# Patient Record
Sex: MALE | Race: WHITE | NOT HISPANIC OR LATINO | Employment: FULL TIME | URBAN - METROPOLITAN AREA
[De-identification: names, ages, dates, MRNs, and addresses within clinical notes are randomized per-mention and may not be internally consistent; named-entity substitution may affect disease eponyms.]

---

## 2018-06-21 ENCOUNTER — HISTORICAL (OUTPATIENT)
Dept: ADMINISTRATIVE | Facility: HOSPITAL | Age: 58
End: 2018-06-21

## 2018-06-21 ENCOUNTER — HISTORICAL (OUTPATIENT)
Dept: LAB | Facility: HOSPITAL | Age: 58
End: 2018-06-21

## 2018-06-21 LAB
ABS NEUT (OLG): 6.9
ALBUMIN SERPL-MCNC: 4.3 GM/DL (ref 3.4–5)
ALBUMIN/GLOB SERPL: 1.87 {RATIO} (ref 1.5–2.5)
ALP SERPL-CCNC: 70 UNIT/L (ref 38–126)
ALT SERPL-CCNC: 20 UNIT/L (ref 7–52)
APPEARANCE, UA: ABNORMAL
AST SERPL-CCNC: 18 UNIT/L (ref 15–37)
BACTERIA #/AREA URNS AUTO: ABNORMAL /HPF
BILIRUB SERPL-MCNC: 0.6 MG/DL (ref 0.2–1)
BILIRUB UR QL STRIP: NEGATIVE MG/DL
BILIRUBIN DIRECT+TOT PNL SERPL-MCNC: 0.1 MG/DL (ref 0–0.5)
BILIRUBIN DIRECT+TOT PNL SERPL-MCNC: 0.5 MG/DL
BUN SERPL-MCNC: 28 MG/DL (ref 7–18)
CALCIUM SERPL-MCNC: 9 MG/DL (ref 8.5–10)
CHLORIDE SERPL-SCNC: 104 MMOL/L (ref 98–107)
CHOLEST SERPL-MCNC: 178 MG/DL (ref 0–200)
CHOLEST/HDLC SERPL: 6.1 {RATIO}
CO2 SERPL-SCNC: 27 MMOL/L (ref 21–32)
COLOR UR: YELLOW
CREAT SERPL-MCNC: 1.07 MG/DL (ref 0.6–1.3)
DEPRECATED CALCIDIOL+CALCIFEROL SERPL-MC: 30.8 NG/ML (ref 30–80)
ERYTHROCYTE [DISTWIDTH] IN BLOOD BY AUTOMATED COUNT: 13 % (ref 11.5–17)
EST. AVERAGE GLUCOSE BLD GHB EST-MCNC: 123 MG/DL
GLOBULIN SER-MCNC: 2.3 GM/DL (ref 1.2–3)
GLUCOSE (UA): NEGATIVE MG/DL
GLUCOSE SERPL-MCNC: 121 MG/DL (ref 74–106)
HBA1C MFR BLD: 5.9 % (ref 4.4–6.4)
HCT VFR BLD AUTO: 47.9 % (ref 42–52)
HDLC SERPL-MCNC: 29 MG/DL (ref 35–60)
HGB BLD-MCNC: 16.1 GM/DL (ref 14–18)
HGB UR QL STRIP: ABNORMAL UNIT/L
KETONES UR QL STRIP: NEGATIVE MG/DL
LDLC SERPL CALC-MCNC: 89 MG/DL (ref 0–129)
LEUKOCYTE ESTERASE UR QL STRIP: ABNORMAL UNIT/L
LYMPHOCYTES # BLD AUTO: 1.7 X10(3)/MCL (ref 0.6–3.4)
LYMPHOCYTES NFR BLD AUTO: 17.4 % (ref 13–40)
MCH RBC QN AUTO: 29.9 PG (ref 27–31.2)
MCHC RBC AUTO-ENTMCNC: 34 GM/DL (ref 32–36)
MCV RBC AUTO: 89 FL (ref 80–94)
MONOCYTES # BLD AUTO: 1.2 X10(3)/MCL (ref 0–1.8)
MONOCYTES NFR BLD AUTO: 12.2 % (ref 0.1–24)
NEUTROPHILS NFR BLD AUTO: 70.4 % (ref 47–80)
NITRITE UR QL STRIP.AUTO: NEGATIVE
PH UR STRIP: 5.5 [PH]
PLATELET # BLD AUTO: 188 X10(3)/MCL (ref 130–400)
PMV BLD AUTO: 9 FL
POTASSIUM SERPL-SCNC: 5 MMOL/L (ref 3.5–5.1)
PROT SERPL-MCNC: 6.6 GM/DL (ref 6.4–8.2)
PROT UR QL STRIP: ABNORMAL MG/DL
PSA SERPL-MCNC: 4.48 NG/ML (ref 0–3.5)
RBC # BLD AUTO: 5.38 X10(6)/MCL (ref 4.7–6.1)
RBC #/AREA URNS HPF: ABNORMAL /HPF
SODIUM SERPL-SCNC: 138 MMOL/L (ref 136–145)
SP GR UR STRIP: >1.03
SQUAMOUS EPITHELIAL, UA: ABNORMAL /LPF
TRIGL SERPL-MCNC: 329 MG/DL (ref 30–150)
TSH SERPL-ACNC: 1.17 MIU/ML (ref 0.35–4.94)
UROBILINOGEN UR STRIP-ACNC: 0.2 MG/DL
VLDLC SERPL CALC-MCNC: 65.8 MG/DL
WBC # SPEC AUTO: 9.8 X10(3)/MCL (ref 4.5–11.5)
WBC #/AREA URNS AUTO: ABNORMAL /[HPF]

## 2018-06-23 LAB — FINAL CULTURE: NORMAL

## 2018-06-27 ENCOUNTER — HISTORICAL (OUTPATIENT)
Dept: ADMINISTRATIVE | Facility: HOSPITAL | Age: 58
End: 2018-06-27

## 2018-12-03 ENCOUNTER — HISTORICAL (OUTPATIENT)
Dept: ADMINISTRATIVE | Facility: HOSPITAL | Age: 58
End: 2018-12-03

## 2019-06-25 ENCOUNTER — HISTORICAL (OUTPATIENT)
Dept: LAB | Facility: HOSPITAL | Age: 59
End: 2019-06-25

## 2019-06-25 ENCOUNTER — HISTORICAL (OUTPATIENT)
Dept: ADMINISTRATIVE | Facility: HOSPITAL | Age: 59
End: 2019-06-25

## 2019-06-25 LAB
ABS NEUT (OLG): 6.8 X10(3)/MCL (ref 2.1–9.2)
APPEARANCE, UA: ABNORMAL
BACTERIA #/AREA URNS AUTO: ABNORMAL /HPF
BILIRUB UR QL STRIP: NEGATIVE MG/DL
CHOLEST SERPL-MCNC: 131 MG/DL (ref 0–200)
CHOLEST/HDLC SERPL: 4.9 {RATIO}
COLOR UR: YELLOW
DEPRECATED CALCIDIOL+CALCIFEROL SERPL-MC: 26.3 NG/ML (ref 30–80)
ERYTHROCYTE [DISTWIDTH] IN BLOOD BY AUTOMATED COUNT: 13.1 % (ref 11.5–17)
GLUCOSE (UA): NEGATIVE MG/DL
HCT VFR BLD AUTO: 47.3 % (ref 42–52)
HDLC SERPL-MCNC: 27 MG/DL (ref 35–60)
HGB BLD-MCNC: 16.2 GM/DL (ref 14–18)
HGB UR QL STRIP: ABNORMAL UNIT/L
KETONES UR QL STRIP: NEGATIVE MG/DL
LDLC SERPL CALC-MCNC: 56 MG/DL (ref 0–129)
LEUKOCYTE ESTERASE UR QL STRIP: ABNORMAL UNIT/L
LYMPHOCYTES # BLD AUTO: 1.1 X10(3)/MCL (ref 0.6–3.4)
LYMPHOCYTES NFR BLD AUTO: 12.6 % (ref 13–40)
MCH RBC QN AUTO: 29.8 PG (ref 27–31.2)
MCHC RBC AUTO-ENTMCNC: 34 GM/DL (ref 32–36)
MCV RBC AUTO: 87 FL (ref 80–94)
MONOCYTES # BLD AUTO: 0.7 X10(3)/MCL (ref 0.1–1.3)
MONOCYTES NFR BLD AUTO: 8.2 % (ref 0.1–24)
NEUTROPHILS NFR BLD AUTO: 79.2 % (ref 47–80)
NITRITE UR QL STRIP.AUTO: NEGATIVE
PH UR STRIP: 6 [PH]
PLATELET # BLD AUTO: 180 X10(3)/MCL (ref 130–400)
PMV BLD AUTO: 9.3 FL (ref 9.4–12.4)
PROT UR QL STRIP: NEGATIVE MG/DL
PSA SERPL-MCNC: 4.46 NG/ML (ref 0–3.5)
RBC # BLD AUTO: 5.43 X10(6)/MCL (ref 4.7–6.1)
RBC #/AREA URNS HPF: ABNORMAL /HPF
SP GR UR STRIP: 1.02
SQUAMOUS EPITHELIAL, UA: ABNORMAL /LPF
TRIGL SERPL-MCNC: 182 MG/DL (ref 30–150)
TSH SERPL-ACNC: 1.7 MIU/ML (ref 0.35–4.94)
UROBILINOGEN UR STRIP-ACNC: 0.2 MG/DL
VLDLC SERPL CALC-MCNC: 36.4 MG/DL
WBC # SPEC AUTO: 8.6 X10(3)/MCL (ref 4.5–11.5)
WBC #/AREA URNS AUTO: ABNORMAL /[HPF]

## 2019-06-27 LAB — FINAL CULTURE: NO GROWTH

## 2019-08-26 LAB — CRC RECOMMENDATION EXT: NORMAL

## 2019-12-20 ENCOUNTER — HISTORICAL (OUTPATIENT)
Dept: ADMINISTRATIVE | Facility: HOSPITAL | Age: 59
End: 2019-12-20

## 2019-12-20 LAB
CHOLEST SERPL-MCNC: 193 MG/DL (ref 0–200)
CHOLEST/HDLC SERPL: 5.5 {RATIO}
DEPRECATED CALCIDIOL+CALCIFEROL SERPL-MC: 39 NG/ML (ref 30–80)
HDLC SERPL-MCNC: 35 MG/DL (ref 35–60)
LDLC SERPL CALC-MCNC: 122 MG/DL (ref 0–129)
PSA SERPL-MCNC: 5.33 NG/ML (ref 0–3.5)
TRIGL SERPL-MCNC: 134 MG/DL (ref 30–150)
VLDLC SERPL CALC-MCNC: 26.8 MG/DL

## 2020-06-26 ENCOUNTER — HISTORICAL (OUTPATIENT)
Dept: ADMINISTRATIVE | Facility: HOSPITAL | Age: 60
End: 2020-06-26

## 2020-06-26 LAB
ABS NEUT (OLG): 7.3 X10(3)/MCL (ref 2.1–9.2)
ALBUMIN SERPL-MCNC: 4.2 GM/DL (ref 3.4–5)
ALBUMIN/GLOB SERPL: 2 {RATIO} (ref 1.5–2.5)
ALP SERPL-CCNC: 66 UNIT/L (ref 38–126)
ALT SERPL-CCNC: 23 UNIT/L (ref 7–52)
AST SERPL-CCNC: 21 UNIT/L (ref 15–37)
BILIRUB SERPL-MCNC: 0.4 MG/DL (ref 0.2–1)
BILIRUBIN DIRECT+TOT PNL SERPL-MCNC: 0.1 MG/DL (ref 0–0.5)
BILIRUBIN DIRECT+TOT PNL SERPL-MCNC: 0.3 MG/DL
BUN SERPL-MCNC: 22 MG/DL (ref 7–18)
CALCIUM SERPL-MCNC: 9.3 MG/DL (ref 8.5–10)
CHLORIDE SERPL-SCNC: 105 MMOL/L (ref 98–107)
CHOLEST SERPL-MCNC: 179 MG/DL (ref 0–200)
CHOLEST/HDLC SERPL: 5.6 {RATIO}
CO2 SERPL-SCNC: 26 MMOL/L (ref 21–32)
CREAT SERPL-MCNC: 0.95 MG/DL (ref 0.6–1.3)
DEPRECATED CALCIDIOL+CALCIFEROL SERPL-MC: 51.3 NG/ML (ref 30–80)
ERYTHROCYTE [DISTWIDTH] IN BLOOD BY AUTOMATED COUNT: 13.4 % (ref 11.5–17)
GLOBULIN SER-MCNC: 2.1 GM/DL (ref 1.2–3)
GLUCOSE SERPL-MCNC: 118 MG/DL (ref 74–106)
HCT VFR BLD AUTO: 48.3 % (ref 42–52)
HDLC SERPL-MCNC: 32 MG/DL (ref 35–60)
HGB BLD-MCNC: 16.2 GM/DL (ref 14–18)
LDLC SERPL CALC-MCNC: 98 MG/DL (ref 0–129)
LYMPHOCYTES # BLD AUTO: 1.1 X10(3)/MCL (ref 0.6–3.4)
LYMPHOCYTES NFR BLD AUTO: 12.3 % (ref 13–40)
MCH RBC QN AUTO: 29.6 PG (ref 27–31.2)
MCHC RBC AUTO-ENTMCNC: 34 GM/DL (ref 32–36)
MCV RBC AUTO: 88 FL (ref 80–94)
MONOCYTES # BLD AUTO: 0.8 X10(3)/MCL (ref 0.1–1.3)
MONOCYTES NFR BLD AUTO: 9.2 % (ref 0.1–24)
NEUTROPHILS NFR BLD AUTO: 78.5 % (ref 47–80)
PLATELET # BLD AUTO: 196 X10(3)/MCL (ref 130–400)
PMV BLD AUTO: 9.1 FL (ref 9.4–12.4)
POTASSIUM SERPL-SCNC: 5.2 MMOL/L (ref 3.5–5.1)
PROT SERPL-MCNC: 6.3 GM/DL (ref 6.4–8.2)
PSA SERPL-MCNC: 3.04 NG/ML (ref 0–3.5)
RBC # BLD AUTO: 5.47 X10(6)/MCL (ref 4.7–6.1)
SODIUM SERPL-SCNC: 140 MMOL/L (ref 136–145)
TRIGL SERPL-MCNC: 227 MG/DL (ref 30–150)
TSH SERPL-ACNC: 1.33 MIU/ML (ref 0.35–4.94)
VLDLC SERPL CALC-MCNC: 45.4 MG/DL
WBC # SPEC AUTO: 9.2 X10(3)/MCL (ref 4.5–11.5)

## 2020-06-29 LAB
EST. AVERAGE GLUCOSE BLD GHB EST-MCNC: 123 MG/DL
HBA1C MFR BLD: 5.9 % (ref 4.4–6.4)

## 2020-12-29 ENCOUNTER — HISTORICAL (OUTPATIENT)
Dept: ADMINISTRATIVE | Facility: HOSPITAL | Age: 60
End: 2020-12-29

## 2020-12-29 LAB
CHOLEST SERPL-MCNC: 162 MG/DL (ref 0–200)
CHOLEST/HDLC SERPL: 5.1 {RATIO}
EST. AVERAGE GLUCOSE BLD GHB EST-MCNC: 123 MG/DL
HBA1C MFR BLD: 5.9 % (ref 4.4–6.4)
HDLC SERPL-MCNC: 32 MG/DL (ref 35–60)
LDLC SERPL CALC-MCNC: 79 MG/DL (ref 0–129)
PSA SERPL-MCNC: 1.96 NG/ML (ref 0–3.5)
TRIGL SERPL-MCNC: 256 MG/DL (ref 30–150)
VLDLC SERPL CALC-MCNC: 51.2 MG/DL

## 2021-06-02 ENCOUNTER — HISTORICAL (OUTPATIENT)
Dept: ADMINISTRATIVE | Facility: HOSPITAL | Age: 61
End: 2021-06-02

## 2021-06-24 ENCOUNTER — HISTORICAL (OUTPATIENT)
Dept: ADMINISTRATIVE | Facility: HOSPITAL | Age: 61
End: 2021-06-24

## 2021-06-24 LAB
ABS NEUT (OLG): 7.9 X10(3)/MCL (ref 2.1–9.2)
ALBUMIN SERPL-MCNC: 4.4 GM/DL (ref 3.4–5)
ALBUMIN/GLOB SERPL: 1.91 {RATIO} (ref 1.5–2.5)
ALP SERPL-CCNC: 68 UNIT/L (ref 38–126)
ALT SERPL-CCNC: 25 UNIT/L (ref 7–52)
APPEARANCE, UA: ABNORMAL
AST SERPL-CCNC: 21 UNIT/L (ref 15–37)
BACTERIA #/AREA URNS AUTO: ABNORMAL /HPF
BILIRUB SERPL-MCNC: 0.5 MG/DL (ref 0.2–1)
BILIRUB UR QL STRIP: NEGATIVE MG/DL
BILIRUBIN DIRECT+TOT PNL SERPL-MCNC: 0.1 MG/DL (ref 0–0.5)
BILIRUBIN DIRECT+TOT PNL SERPL-MCNC: 0.4 MG/DL
BUN SERPL-MCNC: 21 MG/DL (ref 7–18)
CALCIUM SERPL-MCNC: 9 MG/DL (ref 8.5–10.1)
CHLORIDE SERPL-SCNC: 105 MMOL/L (ref 98–107)
CHOLEST SERPL-MCNC: 177 MG/DL (ref 0–200)
CHOLEST/HDLC SERPL: 5.4 {RATIO}
CO2 SERPL-SCNC: 28 MMOL/L (ref 21–32)
COLOR UR: YELLOW
CREAT SERPL-MCNC: 0.91 MG/DL (ref 0.6–1.3)
DEPRECATED CALCIDIOL+CALCIFEROL SERPL-MC: 55.5 NG/ML (ref 30–80)
ERYTHROCYTE [DISTWIDTH] IN BLOOD BY AUTOMATED COUNT: 13.2 % (ref 11.5–17)
GLOBULIN SER-MCNC: 2.3 GM/DL (ref 1.2–3)
GLUCOSE (UA): NEGATIVE MG/DL
GLUCOSE SERPL-MCNC: 108 MG/DL (ref 74–106)
HCT VFR BLD AUTO: 48.1 % (ref 42–52)
HDLC SERPL-MCNC: 33 MG/DL (ref 35–60)
HGB BLD-MCNC: 16.3 GM/DL (ref 14–18)
HGB UR QL STRIP: NEGATIVE UNIT/L
KETONES UR QL STRIP: NEGATIVE MG/DL
LDLC SERPL CALC-MCNC: 90 MG/DL (ref 0–129)
LEUKOCYTE ESTERASE UR QL STRIP: ABNORMAL UNIT/L
LYMPHOCYTES # BLD AUTO: 1.5 X10(3)/MCL (ref 0.6–3.4)
LYMPHOCYTES NFR BLD AUTO: 15 % (ref 13–40)
MCH RBC QN AUTO: 29.5 PG (ref 27–31.2)
MCHC RBC AUTO-ENTMCNC: 34 GM/DL (ref 32–36)
MCV RBC AUTO: 87 FL (ref 80–94)
MONOCYTES # BLD AUTO: 0.8 X10(3)/MCL (ref 0.1–1.3)
MONOCYTES NFR BLD AUTO: 7.9 % (ref 0.1–24)
NEUTROPHILS NFR BLD AUTO: 77.1 % (ref 47–80)
NITRITE UR QL STRIP.AUTO: NEGATIVE
PH UR STRIP: 5.5 [PH]
PLATELET # BLD AUTO: 195 X10(3)/MCL (ref 130–400)
PMV BLD AUTO: 9.1 FL (ref 9.4–12.4)
POTASSIUM SERPL-SCNC: 4.3 MMOL/L (ref 3.5–5.1)
PROT SERPL-MCNC: 6.7 GM/DL (ref 6.4–8.2)
PROT UR QL STRIP: NEGATIVE MG/DL
RBC # BLD AUTO: 5.53 X10(6)/MCL (ref 4.7–6.1)
RBC #/AREA URNS HPF: ABNORMAL /HPF
SODIUM SERPL-SCNC: 140 MMOL/L (ref 136–145)
SP GR UR STRIP: 1.02
SQUAMOUS EPITHELIAL, UA: ABNORMAL /LPF
TRIGL SERPL-MCNC: 269 MG/DL (ref 30–150)
TSH SERPL-ACNC: 0.85 MIU/ML (ref 0.35–4.94)
UROBILINOGEN UR STRIP-ACNC: 1 MG/DL
VLDLC SERPL CALC-MCNC: 53.8 MG/DL
WBC # SPEC AUTO: 10.2 X10(3)/MCL (ref 4.5–11.5)
WBC #/AREA URNS AUTO: ABNORMAL /[HPF]

## 2021-06-26 LAB — FINAL CULTURE: NO GROWTH

## 2021-06-28 LAB
EST. AVERAGE GLUCOSE BLD GHB EST-MCNC: 117 MG/DL
HBA1C MFR BLD: 5.7 % (ref 4.4–6.4)

## 2021-08-26 ENCOUNTER — HISTORICAL (OUTPATIENT)
Dept: ADMINISTRATIVE | Facility: HOSPITAL | Age: 61
End: 2021-08-26

## 2021-08-26 LAB
ABS NEUT (OLG): 6.9 X10(3)/MCL (ref 2.1–9.2)
ERYTHROCYTE [DISTWIDTH] IN BLOOD BY AUTOMATED COUNT: 13.6 % (ref 11.5–17)
HCT VFR BLD AUTO: 45.3 % (ref 42–52)
HGB BLD-MCNC: 15.1 GM/DL (ref 14–18)
LYMPHOCYTES # BLD AUTO: 1.4 X10(3)/MCL (ref 0.6–3.4)
LYMPHOCYTES NFR BLD AUTO: 15.7 % (ref 13–40)
MCH RBC QN AUTO: 29 PG (ref 27–31.2)
MCHC RBC AUTO-ENTMCNC: 33 GM/DL (ref 32–36)
MCV RBC AUTO: 87 FL (ref 80–94)
MONOCYTES # BLD AUTO: 0.8 X10(3)/MCL (ref 0.1–1.3)
MONOCYTES NFR BLD AUTO: 8.8 % (ref 0.1–24)
NEUTROPHILS NFR BLD AUTO: 75.5 % (ref 47–80)
PLATELET # BLD AUTO: 188 X10(3)/MCL (ref 130–400)
PMV BLD AUTO: 9.3 FL (ref 9.4–12.4)
RBC # BLD AUTO: 5.2 X10(6)/MCL (ref 4.7–6.1)
WBC # SPEC AUTO: 9.1 X10(3)/MCL (ref 4.5–11.5)

## 2022-04-10 ENCOUNTER — HISTORICAL (OUTPATIENT)
Dept: ADMINISTRATIVE | Facility: HOSPITAL | Age: 62
End: 2022-04-10

## 2022-04-27 VITALS
OXYGEN SATURATION: 96 % | BODY MASS INDEX: 37.32 KG/M2 | DIASTOLIC BLOOD PRESSURE: 68 MMHG | SYSTOLIC BLOOD PRESSURE: 134 MMHG | WEIGHT: 224 LBS | HEIGHT: 65 IN

## 2022-05-04 NOTE — HISTORICAL [facility]
This is a historical note converted from Yordan. Formatting and pictures may have been removed.  Please reference Yordan for original formatting and attached multimedia. Chief Complaint  FATIGUE SINCE COVID 19 - 6 WEEKS  ELEVATED B/P  History of Present Illness  Pt was diagnosed with COVID 6 weeks ago. He did get an infusion. He was prescribed a cough medication and Zithromax. He was having sinus issues when he got tested. He is still having sinus issues and a cough that will not resolve. His cough is dry. No fever/chills. He feels his breathing is not back to normal. Nasal congestion. No nasal discharge. + scratchy throat. He does take Claritin and Mucinex. He never lost taste or smell. No nausea or vomiting. He denies diarrhea. He has?occasional headaches in the afternoon. His appetite is back to normal.  Review of Systems  See HPI.  Physical Exam  Vitals & Measurements  T:?36.7? ?C (Oral)? HR:?83(Peripheral)? BP:?134/68? SpO2:?96%?  HT:?165.1?cm? HT:?165.10?cm? WT:?101.6?kg? WT:?101.600?kg? BMI:?37.27?  General:?He is a well-developed well-nourished obese white male in no apparent distress. ?His speech is clear. ?His respirations are nonlabored.  Ears: Left:?Canal is clear,?slight fluid behind tympanic membrane.? Right:?Canal is clear,?tympanic membrane is clear with slightly altered light reflex.  Nares: Mucosa/turbinates with edema, erythema.  Oropharynx:?Postnasal drainage noted.  Chest: Clear to auscultation bilaterally.  CV: Regular rate and rhythm without murmurs rubs or gallops.  Assessment/Plan  1.?Fatigue?R53.83  His CBC is normal.  I advised him?post COVID fatigue is common.??I am unable to?know how long?this will last.  Rest/fluids.  Immune system support.  Call if new symptoms develop.  ?  2.?COVID-19 virus infection?U07.1  Chest x-ray pending.  His CBC is normal.  Pulse ox is 96%.  Patient advised?to call?for new?symptoms or any breathing difficulties.  Ordered:  XR Chest 2 Views, Routine, 08/26/21  13:54:00 CDT, Other (please specify), None, Ambulatory, Rad Type, COVID-19 virus infection, Not Scheduled, [facility], 08/26/21 13:54:00 CDT  ?   Problem List/Past Medical History  Ongoing  C6 radiculopathy  Elevated PSA  Gastroesophageal reflux disease  Hypertension  Hypertriglyceridemia  Hypothyroidism  Lumbar radiculopathy  Neck pain  Obesity  ERIC - Obstructive sleep apnea  Rotator cuff tendinitis  Vitamin D deficiency  Wellness examination  Historical  Acromioclavicular pain  Bursitis  Cervical strain  Chronic cough  Elevated PSA  Enlarged prostate  Epigastric pain  Kidney stone  Pancreatic mass  Sleep apnea  Thyroid disease  Upper respiratory infection  Procedure/Surgical History  Biopsy of prostate (05.2020)  Colonoscopy (07/01/2019)  FESS Septo Turb Cautery (.) (10/16/2015)  Nasal/sinus endoscopy, surgical, with maxillary antrostomy; (10/16/2015)  Nasal/sinus endoscopy, surgical; with ethmoidectomy, partial (anterior) (10/16/2015)  Septoplasty or submucous resection, with or without cartilage scoring, contouring or replacement with graft (10/16/2015)  Submucous resection inferior turbinate, partial or complete, any method (10/16/2015)  Biopsy Gastrointestional (06/03/2015)  Diagnostic ultrasound of digestive system (06/03/2015)  Endoscopic Ultrasonography (Upper) (06/03/2015)  Esophagogastroduodenoscopy (06/03/2015)  Esophagogastroduodenoscopy [EGD] with closed biopsy (06/03/2015)  Esophagogastroduodenoscopy, flexible, transoral; with biopsy, single or multiple (06/03/2015)  Esophagogastroduodenoscopy, flexible, transoral; with transendoscopic ultrasound-guided intramural or transmural fine needle aspiration/biopsy(s) (includes endoscopic ultrasound examination of the esophagus, stomach, and either the duodenum or a surgicall (06/03/2015)  Cholecystectomy  Lithotripsy  sx to remove kidney stones  Tonsillectomy   Medications  amlodipine 5 mg oral tablet, 5 mg= 1 tab(s), Oral, Daily, 3  refills  Aspir 81 oral delayed release tablet, 81 mg= 1 tab(s), Oral, Daily  Euthyrox 50 mcg (0.05 mg) oral tablet, See Instructions, 3 refills  finasteride 5 mg oral tablet, 5 mg= 1 tab(s), Oral, Daily  lisinopril 40 mg oral tablet, See Instructions, 3 refills  Pepcid 20 mg oral tablet, 20 mg= 1 tab(s), Oral, Once a day (at bedtime)  Protonix 40 mg ORAL enteric coated tablet, 40 mg= 1 tab(s), Oral, Daily, 3 refills  tamsulosin 0.4 mg oral capsule, 0.4 mg= 1 cap(s), Oral, Daily  Vitamin D3 5000 intl units (125 mcg) oral capsule, 50974 IntUnit= 5 cap(s), Oral, qMonday  Zyrtec 10 mg oral tablet, 10 mg= 1 tab(s), Oral, Daily  Allergies  No Known Medication Allergies  Social History  Abuse/Neglect  No, 08/26/2021  No, 06/24/2021  No, 06/26/2020  Alcohol - Denies Alcohol Use, 10/15/2015  Current, Beer, 1-2 times per month, 06/24/2021  Current, 1-2 times per year, 06/21/2018  Employment/School  Employed, Work/School description: ., 06/24/2021  Employed, Work/School description: ., 06/21/2018  Exercise  Home/Environment  Lives with Spouse. Living situation: Home/Independent. CPAP/BiPAP, 06/24/2021  Lives with Spouse. Living situation: Home/Independent. Home equipment: CPAP/BiPAP., 06/21/2018  Nutrition/Health  Regular, Good, 06/24/2021  Regular, Caffeine intake amount: 2-3 SODAS PER DAY., 06/21/2018  Substance Use - Denies Substance Abuse, 05/26/2015  Never, 06/24/2021  Never, 07/28/2016  Tobacco - Denies Tobacco Use, 05/26/2015  Never (less than 100 in lifetime), No, 08/26/2021  Former smoker, quit more than 30 days ago, No, 06/24/2021  Former smoker, quit more than 30 days ago, Cigarettes, N/A, Smokeless Tobacco Use: Smokeless tobacco user within last 30 days. 18 Years (Age started). 31 Years (Age stopped)., 06/26/2020  Family History  CAD - Coronary artery disease: Mother.  Diabetes mellitus: Father.  Heart attack: Mother and Father.  Hypertension: Father.  Peptic ulcer disease: Father.  Stent  placement: Mother and Father.  Immunizations  Vaccine Date Status Comments   tetanus/diphtheria/pertussis, acel(Tdap) 06/26/2020 Given    influenza virus vaccine, inactivated - Not Given Patient Refuses  absolutely does not want vaccine   pneumococcal 13-valent conjugate vaccine 04/22/2015 Recorded    tetanus/diphtheria/pertussis, acel(Tdap) 08/02/2011 Recorded    Health Maintenance  Health Maintenance  ???Pending?(in the next year)  ??? ??OverDue  ??? ? ? ?Aspirin Therapy for CVD Prevention due??06/08/18??and every 1??year(s)  ??? ? ? ?Depression Screening due??12/03/19??and every 1??year(s)  ??? ? ? ?Alcohol Misuse Screening due??01/02/21??and every 1??year(s)  ??? ? ? ?Hypertension Management-BMP due??06/26/21??and every 1??year(s)  ??? ??Due?  ??? ? ? ?ADL Screening due??08/26/21??and every 1??year(s)  ??? ? ? ?Hypertension Management-Education due??08/26/21??and every 1??year(s)  ??? ? ? ?Zoster Vaccine due??08/26/21??Unknown Frequency  ??? ??Due In Future?  ??? ? ? ?Obesity Screening not due until??01/01/22??and every 1??year(s)  ??? ? ? ?Diabetes Screening not due until??06/28/22??and every 1??year(s)  ???Satisfied?(in the past 1 year)  ??? ??Satisfied?  ??? ? ? ?Blood Pressure Screening on??08/26/21.??Satisfied by Darius Marshall LPN  ??? ? ? ?Body Mass Index Check on??08/26/21.??Satisfied by Darius Marshall LPN  ??? ? ? ?Diabetes Screening on??06/28/21.??Satisfied by Julio Slater  ??? ? ? ?Hypertension Management-Blood Pressure on??08/26/21.??Satisfied by Darius Marshall LPN  ??? ? ? ?Influenza Vaccine on??12/29/20.??Satisfied by Latoya Grissom LPN  ??? ? ? ?Lipid Screening on??06/24/21.??Satisfied by Lourdes Arellano  ??? ? ? ?Obesity Screening on??08/26/21.??Satisfied by Darius Marshall LPN  ?  Lab Results  Test Name Test Result Date/Time   WBC 9.1 x10(3)/mcL 08/26/2021 14:02 CDT   Hgb 15.1 gm/dL 08/26/2021 14:02 CDT   Hct 45.3 % 08/26/2021 14:02 CDT   Platelet 188 x10(3)/mcL 08/26/2021 14:02 CDT    Neutro Auto 75.5 % 08/26/2021 14:02 CDT   Lymph Auto 15.7 % 08/26/2021 14:02 CDT       His chest x-ray is normal.

## 2022-05-04 NOTE — HISTORICAL [facility]
This is a historical note converted from Yordan. Formatting and pictures may have been removed.  Please reference Yordan for original formatting and attached multimedia. Chief Complaint  NECK AND RIGHT SHOULDER PAIN DOI 2 WEEKS HE STATES HE HAS TI LIFT UP ON HIS DAD AS HE HAS CANCER. HE HASNT SEEN A DRGabino FOR THIS PAIN.  History of Present Illness  he comes in today complaining of two-week history of cervical pain radiating into his right arm and index finger.?His dad has metastatic cancer and he has to lift and take care of him. He notes this started 2 weeks ago after he had been lifting him a lot for the whole weekend. The pain is at the base of his neck and medial to the scapula. It radiates down his right arm into his right index finger with numbness and tingling on the dorsum of his right hand and index finger.?He has no bowel or bladder problems. No myelopathic symptoms no balance problems.?Overall his health has been doing well. Hes been notified that his A1c?became elevated a small amount but is not enough for him to be on any type of?medication.  Review of Systems  Comprehensive Review of Systems performed with no exceptions other than as noted in HPI.  ?  ?  Physical Exam  Vitals & Measurements  BP:?110/70?  HT:?172?cm? HT:?172?cm? WT:?105.1?kg? WT:?105.1?kg? BMI:?35.53?  General_oriented ?3  Eye_PERRLA  HENT_normal hearing  Neck_  Respiratory_normal breath sounds  Cardiovascular_regular rate and rhythm  Gastrointestinal_no palpable masses or abdominal pain, no pulsatile masses  Lymphatics_no extremity edema or swelling  Musculoskeletal_examination of the cervical spine. He sits with a slight head forward posture. He is tender to palpation over the lower cervical spine on the right side out over the right trapezius?and particularly tender?just medial to the right?and of the right scapula.?He has full flexion but limitations of extension as this causes pain. Rotation?and lateral bending to the?left?leads to  some radiating pain as it doesnt right. Biceps, triceps brachioradialis reflexes are +2 and symmetrical. Shoulder girdle, biceps, triceps, wrist extension and flexion are normal and symmetrical. Radial pulses are +2 and symmetrical.?Tinel signs are negative at the wrist and the elbow. Barbra signs are negative. Sensation is equal to light touch in both upper extremities.  Integumentary_no rashes or skin lesions  Neurologic_CNS II-XII?grossly intact  Assessment/Plan  1.?C6 radiculopathy  ? I discussed with him that he has radicular symptoms. I think this is more likely than not from?a cervical strain or sprain.?When his one place him on a Medrol Dosepak. I did  within about?stopping the meloxicam while he is on this. I have refilled his meloxicam. I have also discussed with him?there is possibility of increasing his blood sugar. He understands this. There is no improvement he will call me back in 1 week and we will get him back again.  Ordered:  meloxicam, 7.5 mg = 1 tab(s), Oral, BID, # 60 tab(s), 3 Refill(s), Pharmacy: [place] [place]  methylPREDNISolone, = 1 packet(s), Oral, As Directed, as directed on package labeling, # 21 tab(s), 0 Refill(s), Pharmacy: [place]  Office/Outpatient Visit Level 4 Established 29983 PC, C6 radiculopathy  Cervical strain  Neck pain, [facility], 06/27/18 14:27:00 CDT  ?  2.?Cervical strain  Ordered:  meloxicam, 7.5 mg = 1 tab(s), Oral, BID, # 60 tab(s), 3 Refill(s), Pharmacy: [facility]  methylPREDNISolone, = 1 packet(s), Oral, As Directed, as directed on package labeling, # 21 tab(s), 0 Refill(s), Pharmacy: [facility]  Office/Outpatient Visit Level 4 Established [id number] PC, C6 radiculopathy  Cervical strain  Neck pain, [facility], 06/27/18 14:27:00 CDT  ?  Neck pain  Ordered:  meloxicam, 7.5 mg = 1 tab(s), Oral, BID, # 60 tab(s), 3 Refill(s), Pharmacy:  [facility] - [place], [place]  methylPREDNISolone, = 1 packet(s), Oral, As Directed, as directed on package labeling, # 21 tab(s), 0 Refill(s), Pharmacy: [facility] [place], [place]  Office/Outpatient Visit Level 4 Established [id number] PC, C6 radiculopathy  Cervical strain  Neck pain, [facility], 06/27/18 14:27:00 CDT  XR Spine Cervical Minimum 4 Views, Routine, 06/27/18 14:06:00 CDT, Pain, None, Ambulatory, Rad Type, Neck pain, Not Scheduled, 06/27/18 14:06:00 CDT  ?  Orders:  Clinic Follow-up PRN, 06/27/18 14:27:00 CDT, Future Order, [facility]   Problem List/Past Medical History  Ongoing  C6 radiculopathy  Cervical strain  Gastroesophageal reflux disease  Hypertension  Hypertriglyceridemia  Hypothyroidism  Lumbar radiculopathy  Multiple allergies  Obesity  ERIC - Obstructive sleep apnea  Vitamin D deficiency  Wellness examination  Historical  Bursitis  Chronic cough  Elevated PSA  Enlarged prostate  Epigastric pain  Kidney stone  Pancreatic mass  Sleep apnea  Thyroid disease  Procedure/Surgical History  FESS Septo Turb Cautery (.) (10/16/2015), Nasal/sinus endoscopy, surgical, with maxillary antrostomy; (10/16/2015), Nasal/sinus endoscopy, surgical; with ethmoidectomy, partial (anterior) (10/16/2015), Septoplasty or submucous resection, with or without cartilage scoring, contouring or replacement with graft (10/16/2015), Submucous resection inferior turbinate, partial or complete, any method (10/16/2015), Biopsy Gastrointestional (06/03/2015), Diagnostic ultrasound of digestive system (06/03/2015), Endoscopic Ultrasonography (Upper) (06/03/2015), Esophagogastroduodenoscopy (06/03/2015), Esophagogastroduodenoscopy [EGD] with closed biopsy (06/03/2015), Esophagogastroduodenoscopy, flexible, transoral; with biopsy, single or multiple (06/03/2015), Esophagogastroduodenoscopy, flexible, transoral; with transendoscopic ultrasound-guided intramural or transmural fine needle  aspiration/biopsy(s) (includes endoscopic ultrasound examination of the esophagus, stomach, and either the duodenum or a surgicall (06/03/2015), Cholecystectomy, Lithotripsy, sx to remove kidney stones, Tonsillectomy.  Medications  Aspir 81 oral delayed release tablet, 81 mg= 1 tab(s), Oral, Daily  levothyroxine 50 mcg (0.05 mg) oral tablet, 50 mcg= 1 tab(s), Oral, Daily, 11 refills  lisinopril 40 mg oral tablet, 40 mg= 1 tab(s), Oral, Daily  Medrol Dosepak 4 mg oral tablet, 1 packet(s), Oral, As Directed  Mobic 7.5 mg oral tablet, 7.5 mg= 1 tab(s), Oral, BID, 3 refills  Mobic 7.5 mg oral tablet, 7.5 mg= 1 tab(s), Oral, BID, 3 refills  Mucinex 600 mg oral tablet, extended release, 600 mg= 1 tab(s), Oral, q12hr  Protonix 40 mg ORAL enteric coated tablet, 40 mg= 1 tab(s), Oral, Daily  Zyrtec 10 mg oral tablet, 10 mg= 1 tab(s), Oral, Daily  Allergies  No Known Medication Allergies  Social History  Alcohol - Denies Alcohol Use, 10/15/2015  Current, 1-2 times per year, 06/21/2018  Employment/School  Employed, Work/School description: ., 06/21/2018  Exercise  Home/Environment  Lives with Spouse. Living situation: Home/Independent. Home equipment: CPAP/BiPAP., 06/21/2018  Nutrition/Health  Regular, Caffeine intake amount: 2-3 SODAS PER DAY., 06/21/2018  Substance Abuse - Denies Substance Abuse, 05/26/2015  Never, 07/28/2016  Tobacco - Denies Tobacco Use, 05/26/2015  Smokeless tobacco Use:. Oral Type:., 06/21/2018  Family History  CAD - Coronary artery disease: Mother.  Diabetes mellitus: Father.  Heart attack: Mother and Father.  Hypertension: Father.  Peptic ulcer disease: Father.  Stent placement: Mother and Father.  Immunizations  Vaccine Date Status Comments   influenza virus vaccine, inactivated - Not Given Patient Refuses  absolutely does not want vaccine   pneumococcal 13-valent conjugate vaccine 04/22/2015 Recorded    tetanus/diphtheria/pertussis, acel(Tdap) 08/02/2011 Recorded    Health  Maintenance  Health Maintenance  ???Pending?(in the next year)  ??? ??Due?  ??? ? ? ?Aspirin Therapy for CVD Prevention due??06/08/18??and every 1??year(s)  ??? ? ? ?Alcohol Misuse Screening due??06/27/18??and every 1??year(s)  ??? ? ? ?Colorectal Screening due??06/27/18??Variable frequency  ??? ? ? ?Hypertension Management-Education due??06/27/18??and every 1??year(s)  ??? ??Due In Future?  ??? ? ? ?Influenza Vaccine not due until??10/02/18??and every 1??year(s)  ??? ? ? ?Hypertension Management-Blood Pressure not due until??12/27/18??and every 6??month(s)  ??? ? ? ?Hypertension Management-BMP not due until??06/21/19??and every 1??year(s)  ??? ? ? ?Lipid Screening not due until??06/21/19??and every 1??year(s)  ???Satisfied?(in the past 1 year)  ??? ??Satisfied?  ??? ? ? ?Blood Pressure Screening on??06/27/18.??Satisfied by Khadijah Brody  ??? ? ? ?Body Mass Index Check on??06/27/18.??Satisfied by Khadijah Brody  ??? ? ? ?Depression Screening on??06/27/18.??Satisfied by Khadijah Brody  ??? ? ? ?Diabetes Screening on??06/21/18.??Satisfied by Julio Slater  ??? ? ? ?Hypertension Management-Blood Pressure on??06/27/18.??Satisfied by Khadijah Brody  ??? ? ? ?Influenza Vaccine on??12/02/17.??Satisfied by ER, Physician  ??? ? ? ?Lipid Screening on??06/21/18.??Satisfied by Julio Slater  ??? ? ? ?Obesity Screening on??06/27/18.??Satisfied by Khadijah Brody  ??? ? ? ?Smoking Cessation on??06/27/18.??Satisfied by Khadijah Brody  ??? ? ? ?Tobacco Use Screening on??06/27/18.??Satisfied by Khadijah Brody  ?  ?  Diagnostic Results  5 views of the cervical spine?are normal.

## 2022-05-04 NOTE — HISTORICAL [facility] CERN[facility]
This is a historical note converted from Yordan. Formatting and pictures may have been removed.  Please reference Yordan for original formatting and attached multimedia. Chief Complaint  RIGHT SHOULDER NO PARTICULAR INJURY HE HAD WORKED ON HIS TRUCK, HURTS TO RAISE HIS RIGHT ARM. HIS PAIN IS AT A 3 TODAY.  History of Present Illness  He comes in with about a 3 week history of right shoulder pain. He notes that it hurts?whenever he does certain?things with his right arm. It hurts over the anterior right shoulder?and down in the deltoid distribution. He has no numbness or tingling with this. It is primarily when he raises his arm in front of him about this. He relates no history of trauma.  Review of Systems  Comprehensive Review of Systems performed with no exceptions other than as noted in HPI.  ?  ?  Physical Exam  Vitals & Measurements  BP:?122/88?  HT:?172?cm? HT:?172?cm? WT:?105.1?kg? WT:?105.1?kg? BMI:?35.53?  Examination of the right shoulder. He is tender to palpation over the acromioclavicular joint and the anterior shoulder. He has a positive Neer test. He has positive empty can test. He has a full range of motion. Neurovascularly he is intact.  ?  Examination of the left shoulder?has a full range of motion without pain.?All provocative tests are within normal limits.  Assessment/Plan  1.?Impingement syndrome of right shoulder?M75.41  ? I discussed with him my findings. I recommended an injection of Celestone and Xylocaine.?He agrees with this. I also recommended that he ice it tonight and I instructed him on circumduction exercises. I will see him back in 3 weeks.  ?  Under sterile conditions 2 mL of Celestone and 2 mL of plain Xylocaine were injected into the subacromial space.  2.?Pain in right shoulder?M25.511,? Pain in right shoulder?M25.511  Orders:  XR Shoulder Right Minimum 2 Views, Routine, 12/03/18 9:30:00 CST, Pain, None, Ambulatory, Rad Type, Shoulder pain, Not Scheduled, 12/03/18 9:30:00 CST    Problem List/Past Medical History  Ongoing  Acromioclavicular pain  C6 radiculopathy  Cervical strain  Gastroesophageal reflux disease  Hypertension  Hypertriglyceridemia  Hypothyroidism  Lumbar radiculopathy  Multiple allergies  Neck pain  Obesity  ERIC - Obstructive sleep apnea  Rotator cuff tendinitis  Vitamin D deficiency  Wellness examination  Historical  Bursitis  Chronic cough  Elevated PSA  Enlarged prostate  Epigastric pain  Kidney stone  Pancreatic mass  Sleep apnea  Thyroid disease  Procedure/Surgical History  FESS Septo Turb Cautery (.) (10/16/2015)  Biopsy Gastrointestional (06/03/2015)  Endoscopic Ultrasonography (Upper) (06/03/2015)  Esophagogastroduodenoscopy (06/03/2015)  Cholecystectomy  Lithotripsy  sx to remove kidney stones  Tonsillectomy   Medications  Aspir 81 oral delayed release tablet, 81 mg= 1 tab(s), Oral, Daily  levothyroxine 50 mcg (0.05 mg) oral tablet, 50 mcg= 1 tab(s), Oral, Daily, 11 refills  lisinopril 40 mg oral tablet, 40 mg= 1 tab(s), Oral, Daily, 10 refills  Mobic 7.5 mg oral tablet, 7.5 mg= 1 tab(s), Oral, BID, 3 refills  Mobic 7.5 mg oral tablet, 7.5 mg= 1 tab(s), Oral, BID, 3 refills  Mucinex 600 mg oral tablet, extended release, 600 mg= 1 tab(s), Oral, q12hr  Protonix 40 mg ORAL enteric coated tablet, 40 mg= 1 tab(s), Oral, Daily  Zyrtec 10 mg oral tablet, 10 mg= 1 tab(s), Oral, Daily  Allergies  No Known Medication Allergies  Social History  Alcohol - Denies Alcohol Use, 10/15/2015  Current, 1-2 times per year, 06/21/2018  Employment/School  Employed, Work/School description: ., 06/21/2018  Exercise  Home/Environment  Lives with Spouse. Living situation: Home/Independent. Home equipment: CPAP/BiPAP., 06/21/2018  Nutrition/Health  Regular, Caffeine intake amount: 2-3 SODAS PER DAY., 06/21/2018  Substance Abuse - Denies Substance Abuse, 05/26/2015  Never, 07/28/2016  Tobacco - Denies Tobacco Use, 05/26/2015  Smokeless tobacco, No, 12/03/2018  Smokeless tobacco  Use:. Oral Type:., 06/21/2018  Family History  CAD - Coronary artery disease: Mother.  Diabetes mellitus: Father.  Heart attack: Mother and Father.  Hypertension: Father.  Peptic ulcer disease: Father.  Stent placement: Mother and Father.  Immunizations  Vaccine Date Status Comments   influenza virus vaccine, inactivated - Not Given Patient Refuses  absolutely does not want vaccine   pneumococcal 13-valent conjugate vaccine 04/22/2015 Recorded    tetanus/diphtheria/pertussis, acel(Tdap) 08/02/2011 Recorded    Health Maintenance  Health Maintenance  ???Pending?(in the next year)  ??? ??OverDue  ??? ? ? ?Diabetes Screening due??and every?  ??? ? ? ?Aspirin Therapy for CVD Prevention due??06/08/18??and every 1??year(s)  ??? ??Due?  ??? ? ? ?ADL Screening due??12/03/18??and every 1??year(s)  ??? ? ? ?Alcohol Misuse Screening due??12/03/18??and every 1??year(s)  ??? ? ? ?Colorectal Screening due??12/03/18??and every?  ??? ? ? ?Hypertension Management-Education due??12/03/18??and every 1??year(s)  ??? ? ? ?Influenza Vaccine due??12/03/18??and every?  ??? ? ? ?Lung Cancer Screening due??12/03/18??and every 1??year(s)  ??? ??Due In Future?  ??? ? ? ?Hypertension Management-BMP not due until??06/21/19??and every 1??year(s)  ??? ? ? ?Blood Pressure Screening not due until??07/25/19??and every 1??year(s)  ??? ? ? ?Body Mass Index Check not due until??07/25/19??and every 1??year(s)  ??? ? ? ?Depression Screening not due until??07/25/19??and every 1??year(s)  ??? ? ? ?Hypertension Management-Blood Pressure not due until??07/25/19??and every 1??year(s)  ??? ? ? ?Smoking Cessation not due until??07/25/19??and every 1??year(s)  ???Satisfied?(in the past 1 year)  ??? ??Satisfied?  ??? ? ? ?Blood Pressure Screening on??12/03/18.??Satisfied by Khadijah Brody  ??? ? ? ?Body Mass Index Check on??12/03/18.??Satisfied by Khadijah Brody  ??? ? ? ?Depression Screening on??12/03/18.??Satisfied by Khadijah Brody  ??? ? ? ?Diabetes Screening  on??06/21/18.??Satisfied by Julio Slater  ??? ? ? ?Hypertension Management-Blood Pressure on??12/03/18.??Satisfied by Khadijah Brody  ??? ? ? ?Influenza Vaccine on??12/03/18.??Satisfied by Khadijah Brody  ??? ? ? ?Lipid Screening on??06/21/18.??Satisfied by Julio Slater  ??? ? ? ?Obesity Screening on??12/03/18.??Satisfied by Khadijah Brody  ??? ? ? ?Smoking Cessation on??07/25/18.??Satisfied by Khadijah Brody  ?  ?  Diagnostic Results  4 views of the right shoulder?there is some mild acromioclavicular joint?changes otherwise and is normal.

## 2022-06-26 PROBLEM — E78.1 HYPERTRIGLYCERIDEMIA: Status: ACTIVE | Noted: 2022-06-26

## 2022-06-26 PROBLEM — Z00.00 ENCOUNTER FOR WELLNESS EXAMINATION IN ADULT: Status: ACTIVE | Noted: 2022-06-26

## 2022-06-26 PROBLEM — R97.20 HIGH PROSTATE SPECIFIC ANTIGEN (PSA): Status: ACTIVE | Noted: 2022-06-26

## 2022-06-26 PROBLEM — E55.9 VITAMIN D DEFICIENCY: Status: ACTIVE | Noted: 2022-06-26

## 2022-06-26 PROBLEM — E03.9 HYPOTHYROIDISM: Status: ACTIVE | Noted: 2022-06-26

## 2022-06-26 PROBLEM — I10 HYPERTENSION: Status: ACTIVE | Noted: 2022-06-26

## 2022-06-26 PROBLEM — K21.9 GASTROESOPHAGEAL REFLUX DISEASE: Status: ACTIVE | Noted: 2022-06-26

## 2022-06-26 PROBLEM — M54.16 LUMBAR RADICULOPATHY: Status: ACTIVE | Noted: 2022-06-26

## 2022-06-26 PROBLEM — G47.33 OBSTRUCTIVE SLEEP APNEA SYNDROME: Status: ACTIVE | Noted: 2022-06-26

## 2022-06-28 PROBLEM — E66.9 OBESITY: Status: ACTIVE | Noted: 2022-06-28

## 2022-06-28 PROBLEM — E66.09 CLASS 2 OBESITY DUE TO EXCESS CALORIES WITHOUT SERIOUS COMORBIDITY WITH BODY MASS INDEX (BMI) OF 38.0 TO 38.9 IN ADULT: Status: ACTIVE | Noted: 2022-06-28

## 2022-06-28 PROBLEM — R53.83 FATIGUE: Status: ACTIVE | Noted: 2022-06-28

## 2022-06-28 PROBLEM — E66.812 CLASS 2 OBESITY DUE TO EXCESS CALORIES WITHOUT SERIOUS COMORBIDITY WITH BODY MASS INDEX (BMI) OF 38.0 TO 38.9 IN ADULT: Status: ACTIVE | Noted: 2022-06-28

## 2022-06-28 PROBLEM — R06.09 DYSPNEA ON EXERTION: Status: ACTIVE | Noted: 2022-06-28

## 2022-09-26 PROBLEM — Z00.00 ENCOUNTER FOR WELLNESS EXAMINATION IN ADULT: Status: RESOLVED | Noted: 2022-06-26 | Resolved: 2022-09-26

## 2022-11-30 PROBLEM — R19.7 DIARRHEA OF PRESUMED INFECTIOUS ORIGIN: Status: ACTIVE | Noted: 2022-11-30

## 2022-12-22 ENCOUNTER — PATIENT OUTREACH (OUTPATIENT)
Dept: ADMINISTRATIVE | Facility: HOSPITAL | Age: 62
End: 2022-12-22
Payer: COMMERCIAL

## 2023-06-30 PROBLEM — Z28.21 IMMUNIZATION REFUSED: Status: ACTIVE | Noted: 2023-06-30

## 2023-10-02 PROBLEM — Z00.00 ENCOUNTER FOR WELLNESS EXAMINATION IN ADULT: Status: RESOLVED | Noted: 2022-06-26 | Resolved: 2023-10-02

## 2024-06-27 ENCOUNTER — LAB VISIT (OUTPATIENT)
Dept: LAB | Facility: HOSPITAL | Age: 64
End: 2024-06-27
Attending: FAMILY MEDICINE
Payer: COMMERCIAL

## 2024-06-27 DIAGNOSIS — Z00.00 WELLNESS EXAMINATION: Primary | ICD-10-CM

## 2024-06-27 DIAGNOSIS — Z00.00 WELLNESS EXAMINATION: ICD-10-CM

## 2024-06-27 LAB
25(OH)D3+25(OH)D2 SERPL-MCNC: 59 NG/ML (ref 30–80)
ALBUMIN SERPL-MCNC: 4 G/DL (ref 3.4–4.8)
ALBUMIN/GLOB SERPL: 1.6 RATIO (ref 1.1–2)
ALP SERPL-CCNC: 50 UNIT/L (ref 40–150)
ALT SERPL-CCNC: 27 UNIT/L (ref 0–55)
AMORPH URATE CRY URNS QL MICRO: ABNORMAL /UL
ANION GAP SERPL CALC-SCNC: 9 MEQ/L
AST SERPL-CCNC: 21 UNIT/L (ref 5–34)
BACTERIA #/AREA URNS AUTO: ABNORMAL /HPF
BASOPHILS # BLD AUTO: 0.03 X10(3)/MCL
BASOPHILS NFR BLD AUTO: 0.4 %
BILIRUB SERPL-MCNC: 0.6 MG/DL
BILIRUB UR QL STRIP.AUTO: NEGATIVE
BUN SERPL-MCNC: 30.2 MG/DL (ref 8.4–25.7)
CALCIUM SERPL-MCNC: 9.4 MG/DL (ref 8.8–10)
CHLORIDE SERPL-SCNC: 106 MMOL/L (ref 98–107)
CHOLEST SERPL-MCNC: 183 MG/DL
CHOLEST/HDLC SERPL: 6 {RATIO} (ref 0–5)
CLARITY UR: ABNORMAL
CO2 SERPL-SCNC: 24 MMOL/L (ref 23–31)
COLOR UR AUTO: ABNORMAL
CREAT SERPL-MCNC: 1.08 MG/DL (ref 0.73–1.18)
CREAT/UREA NIT SERPL: 28
EOSINOPHIL # BLD AUTO: 0.29 X10(3)/MCL (ref 0–0.9)
EOSINOPHIL NFR BLD AUTO: 3.5 %
ERYTHROCYTE [DISTWIDTH] IN BLOOD BY AUTOMATED COUNT: 13.7 % (ref 11.5–17)
EST. AVERAGE GLUCOSE BLD GHB EST-MCNC: 116.9 MG/DL
GFR SERPLBLD CREATININE-BSD FMLA CKD-EPI: >60 ML/MIN/1.73/M2
GLOBULIN SER-MCNC: 2.5 GM/DL (ref 2.4–3.5)
GLUCOSE SERPL-MCNC: 133 MG/DL (ref 82–115)
GLUCOSE UR QL STRIP: NORMAL
HBA1C MFR BLD: 5.7 %
HCT VFR BLD AUTO: 49.9 % (ref 42–52)
HDLC SERPL-MCNC: 30 MG/DL (ref 35–60)
HGB BLD-MCNC: 16.3 G/DL (ref 14–18)
HGB UR QL STRIP: NEGATIVE
IMM GRANULOCYTES # BLD AUTO: 0.04 X10(3)/MCL (ref 0–0.04)
IMM GRANULOCYTES NFR BLD AUTO: 0.5 %
KETONES UR QL STRIP: NEGATIVE
LDLC SERPL CALC-MCNC: 116 MG/DL (ref 50–140)
LEUKOCYTE ESTERASE UR QL STRIP: 500
LYMPHOCYTES # BLD AUTO: 1.04 X10(3)/MCL (ref 0.6–4.6)
LYMPHOCYTES NFR BLD AUTO: 12.5 %
MCH RBC QN AUTO: 30 PG (ref 27–31)
MCHC RBC AUTO-ENTMCNC: 32.7 G/DL (ref 33–36)
MCV RBC AUTO: 91.9 FL (ref 80–94)
MONOCYTES # BLD AUTO: 0.66 X10(3)/MCL (ref 0.1–1.3)
MONOCYTES NFR BLD AUTO: 7.9 %
NEUTROPHILS # BLD AUTO: 6.26 X10(3)/MCL (ref 2.1–9.2)
NEUTROPHILS NFR BLD AUTO: 75.2 %
NITRITE UR QL STRIP: NEGATIVE
NRBC BLD AUTO-RTO: 0 %
PH UR STRIP: 5.5 [PH]
PLATELET # BLD AUTO: 219 X10(3)/MCL (ref 130–400)
PMV BLD AUTO: 10.6 FL (ref 7.4–10.4)
POTASSIUM SERPL-SCNC: 4.3 MMOL/L (ref 3.5–5.1)
PROT SERPL-MCNC: 6.5 GM/DL (ref 5.8–7.6)
PROT UR QL STRIP: ABNORMAL
RBC # BLD AUTO: 5.43 X10(6)/MCL (ref 4.7–6.1)
RBC #/AREA URNS AUTO: ABNORMAL /HPF
SODIUM SERPL-SCNC: 139 MMOL/L (ref 136–145)
SP GR UR STRIP.AUTO: 1.03 (ref 1–1.03)
SQUAMOUS #/AREA URNS LPF: ABNORMAL /HPF
TRIGL SERPL-MCNC: 183 MG/DL (ref 34–140)
TSH SERPL-ACNC: 1.43 UIU/ML (ref 0.35–4.94)
UROBILINOGEN UR STRIP-ACNC: NORMAL
VLDLC SERPL CALC-MCNC: 37 MG/DL
WBC # BLD AUTO: 8.32 X10(3)/MCL (ref 4.5–11.5)
WBC #/AREA URNS AUTO: ABNORMAL /HPF

## 2024-06-27 PROCEDURE — 84443 ASSAY THYROID STIM HORMONE: CPT

## 2024-06-27 PROCEDURE — 85025 COMPLETE CBC W/AUTO DIFF WBC: CPT

## 2024-06-27 PROCEDURE — 82306 VITAMIN D 25 HYDROXY: CPT

## 2024-06-27 PROCEDURE — 83036 HEMOGLOBIN GLYCOSYLATED A1C: CPT

## 2024-06-27 PROCEDURE — 80061 LIPID PANEL: CPT

## 2024-06-27 PROCEDURE — 80053 COMPREHEN METABOLIC PANEL: CPT

## 2024-06-27 PROCEDURE — 81015 MICROSCOPIC EXAM OF URINE: CPT

## 2024-06-27 PROCEDURE — 36415 COLL VENOUS BLD VENIPUNCTURE: CPT

## 2024-06-30 PROBLEM — I20.89 OTHER FORMS OF ANGINA PECTORIS: Status: ACTIVE | Noted: 2024-06-30

## 2024-06-30 PROBLEM — E66.01 SEVERE OBESITY (BMI 35.0-39.9) WITH COMORBIDITY: Status: ACTIVE | Noted: 2024-06-30

## 2024-06-30 PROBLEM — N40.0 BENIGN PROSTATIC HYPERPLASIA: Status: ACTIVE | Noted: 2024-06-30

## 2024-06-30 NOTE — PROGRESS NOTES
..Annual Exam       HPI:     Patient presents for wellness examination.    He has been feeling well.    He has been sleeping so so.  He uses his C-PAP regularly.   His appetite is too good.  He is a   He is  with 3 children.    He may have alcohol on weekends.  He drinks coffee most days; he infrequently has soft drinks.  He does not smoke. He dips while he is working.  He does yd work.  He does not exercise.   Colonoscopy 08/2019: Dr. Reddy; polyps x3, mild diverticulosis, internal hemorrhoids.  Follow up 5 years.  ENT: Dr. Olivera; hearing aids, ERIC on C-PAP.   Urology: Dr Matt Lantigua; BPH, elevated PSA. Sees him next week.  Cardio: Dr. Al; saw him at beginning of year.    The patient's Health Maintenance was reviewed and the following appears to be due at this time:   Health Maintenance Due   Topic Date Due    Hepatitis C Screening  Never done    HIV Screening  Never done    High Dose Statin  Never done    Shingles Vaccine (1 of 2) Never done    RSV Vaccine (Age 60+ and Pregnant patients) (1 - 1-dose 60+ series) Never done    COVID-19 Vaccine (1 - 2023-24 season) Never done    Colorectal Cancer Screening  08/26/2024       ..  Past Medical History:   Diagnosis Date    Elevated PSA     Essential (primary) hypertension     Gastroesophageal reflux disease     Hypertriglyceridemia     Vitamin D deficiency           ..  Past Surgical History:   Procedure Laterality Date    Biopsy Gastrointestional   06/03/2015    CHOLECYSTECTOMY      COLONOSCOPY  07/01/2019    FESS Septo Turb Cautery   10/16/2015    LITHOTRIPSY      PROSTATE BIOPSY  05/2020    sx to remove kidney stones      TONSILLECTOMY            Current Outpatient Medications   Medication Instructions    amLODIPine (NORVASC) 5 mg, Oral, Daily    cetirizine (ZYRTEC) 10 mg, Oral    cholecalciferol, vitamin D3, (VITAMIN D3) 125 mcg (5,000 unit) Tab Oral, Weekly, 25,000 weekly    fenofibrate (TRICOR) 145 mg, Oral    finasteride (PROSCAR) 5 mg,  Oral, Daily    hydroCHLOROthiazide (HYDRODIURIL) 12.5 mg, Oral    levothyroxine (EUTHYROX) 50 mcg, Oral, Daily    pantoprazole (PROTONIX) 40 mg, Oral, Daily    tamsulosin (FLOMAX) 0.4 mg Cap 1 capsule, Oral, Daily    valsartan (DIOVAN) 160 mg, Oral, Daily         ..  Social History     Socioeconomic History    Marital status:     Number of children: 3   Occupational History    Occupation:    Tobacco Use    Smoking status: Former     Current packs/day: 20.00     Types: Cigarettes    Smokeless tobacco: Never   Substance and Sexual Activity    Alcohol use: Yes     Comment: 1-2 times per week    Drug use: Never     Social Determinants of Health     Financial Resource Strain: Low Risk  (6/27/2024)    Overall Financial Resource Strain (CARDIA)     Difficulty of Paying Living Expenses: Not hard at all   Food Insecurity: No Food Insecurity (6/27/2024)    Hunger Vital Sign     Worried About Running Out of Food in the Last Year: Never true     Ran Out of Food in the Last Year: Never true   Physical Activity: Insufficiently Active (6/27/2024)    Exercise Vital Sign     Days of Exercise per Week: 1 day     Minutes of Exercise per Session: 30 min   Stress: No Stress Concern Present (6/27/2024)    [employer] - Occupational Stress Questionnaire     Feeling of Stress : Only a little   Housing Stability: Unknown (6/27/2024)    Housing Stability Vital Sign     Unable to Pay for Housing in the Last Year: No          ..  Family History   Problem Relation Name Age of Onset    Coronary artery disease Mother      Heart attack Mother      Diabetes Father      Heart attack Father      Hypertension Father      Ulcers Father      No Known Problems Sister          Covid    No Known Problems Sister      No Known Problems Brother            ..Review of patient's allergies indicates:  No Known Allergies       ..  Immunization History   Administered Date(s) Administered    Pneumococcal Conjugate -  "13 Valent 04/22/2015    Tdap 08/02/2011, 06/26/2020          REVIEW OF SYSTEMS:    GENERAL: No weight loss, no weight gain, no fever, no fatigue, no chills, no night sweats  HEENT: No sore throat, no ear pain, no sinus pressure, + nasal congestion, broke his nose 3 x, had sinus surgery x 1, no rhinorrhea, + decreased hearing: aids, no snoring  VISION: No vision changes, no blurry vision, no double vision, no glaucoma, no cataracts, + glasses  LAST EYE EXAM: 2023, [facility]  NECK: no LAD  CARDIAC: No chest pain, no palpitations, + dyspnea on exertion, no orthopnea  RESPIRATORY: No cough, no wheezing, no sputum production, no SOB  GI: No abdominal pain, has upset stomach with Fenofibrate,  no N/V, no constipation, no diarrhea, no blood in stool, (- ) family history of Colon Ca  : No dysuria, no hematuria, no frequency, + urgency, + decreased stream, no incontinence, no testicular pain/swelling, (- ) family history of Prostate Ca  MUSC/SKEL: No myalgia, no weakness, occasional ankle edema, no arthralgia, no joint swelling/effusions  SKIN: No rashes, no hives, no itching, no sores  NEURO: + HA's, no numbness, no tingling, no weakness, no dizziness  PSYCH: No anxiety, no depression, no irritability, no panic attacks, no s/i, no h/i, no hallucinations  ENDO: No polyuria, no polyphagia, no polydipsia  HEME: No bruising, no bleeding disorders, no signs of anemia.       PHYSICAL EXAM:    ..Visit Vitals  /76 (BP Location: Right arm, Patient Position: Sitting, BP Method: Large (Automatic))   Pulse 70   Temp 98.8 °F (37.1 °C)   Resp 18   Ht 5' 5" (1.651 m)   Wt 102.1 kg (225 lb)   SpO2 95%   BMI 37.44 kg/m²        General: Well developed, well nourished white male in no apparent distress, alert and oriented x3  Skin: No rash or abnormal lesions  HEENT: Normocephalic, PERRLA, EOMI, mouth WNL, throat WNL, nares normal, EAC and TM WNL bilaterally  Neck: FROM, no LAD, no thyroid abnormalities palpable  Chest: CTA " bilaterally, no wheezes crackles or rubs  Cardiac: RRR, no murmurs, rubs, gallops  Abdomen: Soft, nontender, nondistended, NBSx4, no rebound tenderness or guarding, no HSM  Extremities: No clubbing, cyanosis, or edema. Joints WNL, +2 DP/PT pulses bilaterally  Neuro: No sensory or motor defects noted. CN II-XII intact. Gait WNL.  Genital:  Deferred  Rectal:  Deferred      1. Encounter for wellness examination in adult  Overview:  Patient presents for wellness examination.    He has been feeling well.    He is compliant with his C-PAP.   Patient encouraged to make healthy food choices and limit portions.    Patient encouraged to limit intake of fried foods, processed foods and sugary foods.    Patient encouraged to increase physical activity, exercise and lose weight.    ENT: Dr. Olivera; bilateral hearing loss, now with hearing aids.  Obstructive sleep apnea on CPAP.  Urology: Dr. Matt Lantigua; elevated PSA, BPH.  Cardio: Dr Al; negative cardiac evaluation 10/2022.  Patient declines Shingrix vaccine at this time.    Labs pending.    07/03/2024:  Patient presents for wellness examination.    He has been feeling well.    Patient is compliant with and doing well CPAP therapy.    Patient encouraged to make healthy food choices and limit portions.  Patient encouraged to limit intake of fried foods, processed foods and sugary foods.    Patient encouraged to increase physical activity, exercise regularly and lose weight.    Last colonoscopy 08/2019: Dr Reddy; polyps x3, mild diverticulosis, internal hemorrhoids, follow-up in 5 years.  ENT: Dr. Cristy Olivera; obstructive sleep apnea on CPAP, hearing aids.  Urology: Dr. Matt Lantigua; BPH, elevated PSA, sees him next week.  PSA pending.    Cardio: Dr. Al; saw him beginning of year.  Labs reviewed with patient.    Patient declines Shingrix and RSV vaccines.      2. Essential (primary) hypertension  Overview:  Controlled; continue current medications.    Limit sodium  consumption.    Patient encouraged to increase physical activity, exercise and lose weight.    07/03/2024:  His blood pressure is well controlled; continue amlodipine, valsartan and hydrochlorothiazide; refills sent.    Limit sodium consumption.  Patient encouraged to lose weight.      3. Hypertriglyceridemia  Overview:  Patient discontinue fenofibrate 1 month ago due to GI distress.    Lipid profile pending.    Limit intake of sugary foods, refined carbohydrates and fatty foods.    Patient encouraged to lose weight.    07/03/2024:  Lipid profile: Total cholesterol 183, HDL 30, triglycerides 183 and .  Fenofibrate causes patient GI distress; take 3 times a week.  Continue low-cholesterol/low-fat diet.    Patient encouraged to lose weight.      4. Hypothyroidism, unspecified type  Overview:  Patient has been compliant with medication; refills sent.  TSH pending.    07/03/2024:    TSH 1.42.    Continue current dose of levothyroxine; refills sent.    Orders:  -     levothyroxine (EUTHYROX) 50 MCG tablet; Take 1 tablet (50 mcg total) by mouth once daily.  Dispense: 90 tablet; Refill: 3    5. Vitamin D deficiency  Overview:  Patient is currently on 25,000 units weekly.    Vitamin-D level pending.    07/03/2024: Patient states he takes 5000 units weekly.    Vitamin-D level 59; continue current supplementation.      6. Gastroesophageal reflux disease, unspecified whether esophagitis present  Overview:  His reflux is well controlled on medications; refills sent.    07/03/2024:  His reflux is well controlled on Protonix; refills sent.    Orders:  -     pantoprazole (PROTONIX) 40 MG tablet; Take 1 tablet (40 mg total) by mouth once daily.  Dispense: 90 tablet; Refill: 3    7. Benign prostatic hyperplasia, unspecified whether lower urinary tract symptoms present  Overview:  Stable; followed by Dr. Matt Lantigua.  PSA pending.    Orders:  -     PSA, Screening; Future; Expected date: 07/03/2024    8. Elevated  PSA  Overview:  Followed by Dr. Matt Lantigua; PSA pending.  Will forward results to Dr. Lantigua.    07/03/2024:  Patient with history of elevated PSA; followed by Dr. Matt Lantigua.  PSA pending.      9. Obstructive sleep apnea syndrome  Overview:  Patient is compliant with and doing well on CPAP therapy.     07/03/2024:  Patient is compliant with and doing well on CPAP therapy.      10. Other forms of angina pectoris  Overview:  07/03/2024: Stable; followed by Dr. Al.      11. Dyspnea on exertion  Overview:  Probably secondary to obesity/deconditioning.    Patient had a negative cardiac workup    07/03/2024: Patient still has dyspnea with exertion.  Patient denies any change from previous status.      12. Immunization refused  Overview:  Patient declines shingles vaccine; benefits/potential risks/side effects discussed with patient.    07/03/2024: Patient declines Shingrix and RSV vaccine; benefits and risks reviewed with patient.      13. Class 2 obesity due to excess calories without serious comorbidity with body mass index (BMI) of 37.0 to 37.9 in adult  Overview:  Patient encouraged to make healthy food choices limit portions.    Patient encouraged to limit intake of fried foods, processed foods and sugary foods.    Patient encouraged to increase physical activity, exercise and lose weight.    07/03/2024:  Patient encouraged to make healthy food choices and limit portions.    Patient encouraged to limit intake of fried foods, processed foods and sugary foods.    Patient encouraged to lose weight.       Other orders  -     amLODIPine (NORVASC) 5 MG tablet; Take 1 tablet (5 mg total) by mouth once daily.  Dispense: 90 tablet; Refill: 3  -     valsartan (DIOVAN) 160 MG tablet; Take 1 tablet (160 mg total) by mouth once daily.  Dispense: 90 tablet; Refill: 3         ..No follow-ups on file.     Future Appointments   Date Time Provider Department Center   7/8/2025  7:30 AM Glen Dunne MD [facility] [place]  PC

## 2024-07-03 ENCOUNTER — OFFICE VISIT (OUTPATIENT)
Dept: PRIMARY CARE CLINIC | Facility: CLINIC | Age: 64
End: 2024-07-03
Payer: COMMERCIAL

## 2024-07-03 ENCOUNTER — LAB VISIT (OUTPATIENT)
Dept: LAB | Facility: HOSPITAL | Age: 64
End: 2024-07-03
Attending: STUDENT IN AN ORGANIZED HEALTH CARE EDUCATION/TRAINING PROGRAM
Payer: COMMERCIAL

## 2024-07-03 VITALS
HEART RATE: 70 BPM | SYSTOLIC BLOOD PRESSURE: 127 MMHG | RESPIRATION RATE: 18 BRPM | WEIGHT: 225 LBS | TEMPERATURE: 99 F | OXYGEN SATURATION: 95 % | DIASTOLIC BLOOD PRESSURE: 76 MMHG | BODY MASS INDEX: 37.49 KG/M2 | HEIGHT: 65 IN

## 2024-07-03 DIAGNOSIS — N40.0 BENIGN PROSTATIC HYPERPLASIA, UNSPECIFIED WHETHER LOWER URINARY TRACT SYMPTOMS PRESENT: ICD-10-CM

## 2024-07-03 DIAGNOSIS — E03.9 HYPOTHYROIDISM, UNSPECIFIED TYPE: ICD-10-CM

## 2024-07-03 DIAGNOSIS — G47.33 OBSTRUCTIVE SLEEP APNEA SYNDROME: ICD-10-CM

## 2024-07-03 DIAGNOSIS — E55.9 VITAMIN D DEFICIENCY: ICD-10-CM

## 2024-07-03 DIAGNOSIS — E78.1 HYPERTRIGLYCERIDEMIA: ICD-10-CM

## 2024-07-03 DIAGNOSIS — E66.09 CLASS 2 OBESITY DUE TO EXCESS CALORIES WITHOUT SERIOUS COMORBIDITY WITH BODY MASS INDEX (BMI) OF 37.0 TO 37.9 IN ADULT: ICD-10-CM

## 2024-07-03 DIAGNOSIS — R97.20 ELEVATED PSA: ICD-10-CM

## 2024-07-03 DIAGNOSIS — R06.09 DYSPNEA ON EXERTION: ICD-10-CM

## 2024-07-03 DIAGNOSIS — I10 ESSENTIAL (PRIMARY) HYPERTENSION: ICD-10-CM

## 2024-07-03 DIAGNOSIS — Z28.21 IMMUNIZATION REFUSED: ICD-10-CM

## 2024-07-03 DIAGNOSIS — Z00.00 ENCOUNTER FOR WELLNESS EXAMINATION IN ADULT: Primary | ICD-10-CM

## 2024-07-03 DIAGNOSIS — I20.89 OTHER FORMS OF ANGINA PECTORIS: ICD-10-CM

## 2024-07-03 DIAGNOSIS — K21.9 GASTROESOPHAGEAL REFLUX DISEASE, UNSPECIFIED WHETHER ESOPHAGITIS PRESENT: ICD-10-CM

## 2024-07-03 LAB — PSA SERPL-MCNC: 1.68 NG/ML

## 2024-07-03 PROCEDURE — 3008F BODY MASS INDEX DOCD: CPT | Mod: CPTII,,, | Performed by: FAMILY MEDICINE

## 2024-07-03 PROCEDURE — 84153 ASSAY OF PSA TOTAL: CPT

## 2024-07-03 PROCEDURE — 1159F MED LIST DOCD IN RCRD: CPT | Mod: CPTII,,, | Performed by: FAMILY MEDICINE

## 2024-07-03 PROCEDURE — 1160F RVW MEDS BY RX/DR IN RCRD: CPT | Mod: CPTII,,, | Performed by: FAMILY MEDICINE

## 2024-07-03 PROCEDURE — 3074F SYST BP LT 130 MM HG: CPT | Mod: CPTII,,, | Performed by: FAMILY MEDICINE

## 2024-07-03 PROCEDURE — 36415 COLL VENOUS BLD VENIPUNCTURE: CPT

## 2024-07-03 PROCEDURE — 3044F HG A1C LEVEL LT 7.0%: CPT | Mod: CPTII,,, | Performed by: FAMILY MEDICINE

## 2024-07-03 PROCEDURE — 3078F DIAST BP <80 MM HG: CPT | Mod: CPTII,,, | Performed by: FAMILY MEDICINE

## 2024-07-03 PROCEDURE — 4010F ACE/ARB THERAPY RXD/TAKEN: CPT | Mod: CPTII,,, | Performed by: FAMILY MEDICINE

## 2024-07-03 PROCEDURE — 99396 PREV VISIT EST AGE 40-64: CPT | Mod: ,,, | Performed by: FAMILY MEDICINE

## 2024-07-03 RX ORDER — PANTOPRAZOLE SODIUM 40 MG/1
40 TABLET, DELAYED RELEASE ORAL DAILY
Qty: 90 TABLET | Refills: 3 | Status: SHIPPED | OUTPATIENT
Start: 2024-07-03 | End: 2025-06-28

## 2024-07-03 RX ORDER — LEVOTHYROXINE SODIUM 50 UG/1
50 TABLET ORAL DAILY
Qty: 90 TABLET | Refills: 3 | Status: SHIPPED | OUTPATIENT
Start: 2024-07-03 | End: 2025-06-28

## 2024-07-03 RX ORDER — AMLODIPINE BESYLATE 5 MG/1
5 TABLET ORAL DAILY
Qty: 90 TABLET | Refills: 3 | Status: SHIPPED | OUTPATIENT
Start: 2024-07-03 | End: 2025-07-03

## 2024-07-03 RX ORDER — VALSARTAN 160 MG/1
160 TABLET ORAL DAILY
Qty: 90 TABLET | Refills: 3 | Status: SHIPPED | OUTPATIENT
Start: 2024-07-03 | End: 2025-06-28

## 2024-07-03 RX ORDER — FENOFIBRATE 145 MG/1
145 TABLET, FILM COATED ORAL
COMMUNITY

## 2024-07-03 RX ORDER — HYDROCHLOROTHIAZIDE 12.5 MG/1
12.5 TABLET ORAL
COMMUNITY

## 2024-07-11 ENCOUNTER — TELEPHONE (OUTPATIENT)
Dept: PRIMARY CARE CLINIC | Facility: CLINIC | Age: 64
End: 2024-07-11
Payer: COMMERCIAL

## 2024-07-11 RX ORDER — VALSARTAN 320 MG/1
320 TABLET ORAL DAILY
Qty: 90 TABLET | Refills: 3 | Status: SHIPPED | OUTPATIENT
Start: 2024-07-11 | End: 2025-07-11

## 2024-07-11 NOTE — TELEPHONE ENCOUNTER
Pt wife stated he was taking 320mg, Pt wife stated Dr Al updated the medication and must have not updated the chart. Pt wife stated he has been on 320 for a while now.

## 2024-07-11 NOTE — TELEPHONE ENCOUNTER
Jolly, Nancy MORGAN Staff  Caller: Unspecified (Today, 12:41 PM)  .Who Called: Nate Rico        Preferred Method of Contact: Phone Call  Patient's Preferred Phone Number on File: [phone]  Best Call Back Number, if different:[phone]  Additional Information:  valsartan (DIOVAN) 160 MG tablet the wife said he was on higher dose and wasn't mention pcp was lowering it

## 2024-07-17 ENCOUNTER — TELEPHONE (OUTPATIENT)
Dept: PRIMARY CARE CLINIC | Facility: CLINIC | Age: 64
End: 2024-07-17
Payer: COMMERCIAL

## 2024-07-17 NOTE — TELEPHONE ENCOUNTER
----- Message from Paul Garcia sent at 7/16/2024  8:42 AM CDT -----  Regarding: advice  Who Called: Felecia Rico    Caller is requesting assistance/information from provider's office.    Symptoms (please be specific):    How long has patient had these symptoms:    List of preferred pharmacies on file (remove unneeded): [unfilled]  If different, enter pharmacy into here including location and phone number:       Preferred Method of Contact: Phone Call  Patient's Preferred Phone Number on File: [phone]  Best Call Back Number, if different:  Additional Information: Pt's wife stated pt has been taking valsartan (DIOVAN) 120 and had no knowledge the new prescription with new dosage was sent to pharmacy, pt would like to know if he can double the medication he is currently taking since he has paid for the medication already.Please advise.

## 2024-10-07 PROBLEM — Z00.00 ENCOUNTER FOR WELLNESS EXAMINATION IN ADULT: Status: RESOLVED | Noted: 2022-06-26 | Resolved: 2024-10-07

## 2025-04-21 ENCOUNTER — OFFICE VISIT (OUTPATIENT)
Facility: CLINIC | Age: 65
End: 2025-04-21
Payer: COMMERCIAL

## 2025-04-21 VITALS
WEIGHT: 232.63 LBS | BODY MASS INDEX: 38.76 KG/M2 | HEART RATE: 69 BPM | HEIGHT: 65 IN | SYSTOLIC BLOOD PRESSURE: 143 MMHG | DIASTOLIC BLOOD PRESSURE: 81 MMHG

## 2025-04-21 DIAGNOSIS — M54.16 RIGHT LUMBAR RADICULITIS: Primary | ICD-10-CM

## 2025-04-21 DIAGNOSIS — M47.819 ARTHRITIS OF LOW BACK: ICD-10-CM

## 2025-04-21 DIAGNOSIS — M25.551 RIGHT HIP PAIN: ICD-10-CM

## 2025-04-21 PROCEDURE — 99203 OFFICE O/P NEW LOW 30 MIN: CPT | Mod: ,,, | Performed by: FAMILY MEDICINE

## 2025-04-21 PROCEDURE — 1159F MED LIST DOCD IN RCRD: CPT | Mod: CPTII,,, | Performed by: FAMILY MEDICINE

## 2025-04-21 PROCEDURE — 4010F ACE/ARB THERAPY RXD/TAKEN: CPT | Mod: CPTII,,, | Performed by: FAMILY MEDICINE

## 2025-04-21 PROCEDURE — 1160F RVW MEDS BY RX/DR IN RCRD: CPT | Mod: CPTII,,, | Performed by: FAMILY MEDICINE

## 2025-04-21 PROCEDURE — 3079F DIAST BP 80-89 MM HG: CPT | Mod: CPTII,,, | Performed by: FAMILY MEDICINE

## 2025-04-21 PROCEDURE — 3008F BODY MASS INDEX DOCD: CPT | Mod: CPTII,,, | Performed by: FAMILY MEDICINE

## 2025-04-21 PROCEDURE — 3077F SYST BP >= 140 MM HG: CPT | Mod: CPTII,,, | Performed by: FAMILY MEDICINE

## 2025-04-21 RX ORDER — TIZANIDINE 2 MG/1
2 TABLET ORAL EVERY 8 HOURS PRN
Qty: 21 TABLET | Refills: 0 | Status: SHIPPED | OUTPATIENT
Start: 2025-04-21

## 2025-04-21 NOTE — PROGRESS NOTES
Sports Medicine  Rupert Collins MD    Patient ID: [id number]     Chief Complaint: Pain (RT siatic pain - Patient reports pain is in the lower back and radiates down into the thigh, stopping above knee. Reports a sharp pain down the side/front of the thigh. Reports weakness in RT leg after sitting for long periods. Taking Ibuprofen 800 mg 1-2 daily for pain with some relief. )      HPI:     Nate Rico is a 65 y.o. male here today for a sports medicine visit.     History of Present Illness    HPI:  Patient presents with lower back pain that started about a week ago, radiating down his right leg. He has a history of sciatic problems, which he attributes to his work as a . Pain began in the middle of his back and is worse when sitting for long periods. He reports that the pain worsens with prolonged sitting. The worst pain is localized to his right lower back, extending down to his knee, specifically the outside, and sometimes reaching his foot. He reports occasional numbness in the bottom of his foot and weakness in his right leg, particularly after sitting for extended periods in his truck.    He reports minimal improvement over the past week, despite taking ibuprofen almost daily. He denies any significant improvement in symptoms. He mentions difficulty straightening up after sitting for a long time, stating that he feels locked and unable to straighten up.    Medical history includes leg fractures from falling off a horse at age 9, including breaking his femur twice and his knee once. He denies any history of back fractures. He had a prostate biopsy five years ago due to a spot on his prostate, which was negative for cancer. He continues with annual follow-ups for this issue.    He denies urinary incontinence, bowel incontinence, numbness in the groin, trouble with balance while walking, night sweats, fever, chills, weight loss, or vomiting. He denies any history of prostate cancer or any other form  of cancer.    PREVIOUS TREATMENTS:  Patient has previously used an inverter to alleviate sciatic nerve problems, which typically resolved the issue. However, this method has not been effective for the current episode.          Past Medical History:   Diagnosis Date    Elevated PSA     Essential (primary) hypertension     Gastroesophageal reflux disease     Hypertriglyceridemia     Vitamin D deficiency         Past Surgical History:   Procedure Laterality Date    Biopsy Gastrointestional   06/03/2015    CHOLECYSTECTOMY      COLONOSCOPY  07/01/2019    FESS Septo Turb Cautery   10/16/2015    LITHOTRIPSY      PROSTATE BIOPSY  05/2020    sx to remove kidney stones      TONSILLECTOMY          Social History     Tobacco Use    Smoking status: Former     Current packs/day: 20.00     Types: Cigarettes    Smokeless tobacco: Never    Tobacco comments:     Dips   Substance and Sexual Activity    Alcohol use: Yes     Comment: 1-2 times per week    Drug use: Never    Sexual activity: Not on file        Current Outpatient Medications   Medication Instructions    amLODIPine (NORVASC) 5 mg, Oral, Daily    cetirizine (ZYRTEC) 10 mg    cholecalciferol, vitamin D3, (VITAMIN D3) 125 mcg (5,000 unit) Tab Weekly    fenofibrate (TRICOR) 145 mg    finasteride (PROSCAR) 5 mg, Daily    hydroCHLOROthiazide 12.5 mg    levothyroxine (EUTHYROX) 50 mcg, Oral, Daily    pantoprazole (PROTONIX) 40 mg, Oral, Daily    tamsulosin (FLOMAX) 0.4 mg Cap 1 capsule, Daily    valsartan (DIOVAN) 320 mg, Oral, Daily       Review of patient's allergies indicates:  No Known Allergies     Patient Care Team:  Glen Dunne MD as PCP - General (Family Medicine)  Matt Lantigua MD as Consulting Physician (Urology)  Vincent Reddy MD as Consulting Physician (Gastroenterology)  Ever Al MD as Consulting Physician (Cardiology)     Subjective:     Review of Systems    12 point review of systems conducted, negative except as stated in the history of  "present illness. See HPI for details.    Objective:     Visit Vitals  BP (!) 143/81 (BP Location: Left arm, Patient Position: Sitting)   Pulse 69   Ht 5' 5" (1.651 m)   Wt 105.5 kg (232 lb 9.6 oz)   BMI 38.71 kg/m²       Physical Exam     Hands Free examination:  Patient is awake, alert and in no acute distress.  Respirations are non labored.  No abdominal distension is noted.  No visible rashes on exposed skin.  No lower extremity edema is present.    L Spine:  Forward flexion: 40  Extension: 0°  Normal gait.  There is no pain with percussion of the L spine or sacrum noted. No step offs are appreciated.  Lower extremity strength is 5/5 throughout and symmetric.  Sensation to light touch of the lower extremities is intact and symmetric.  Lower extremity reflexes are present and symmetric throughout, no abnormal reflexes noted.   Passive internal and external rotation of bilateral hips is intact without pain.  Negative INEZ/FADIR testing.  There is pain with palpation of the paraspinal muscles at the level of L5 on the right.  No pain is present with palpation of the SI joints.       Imaging Reviewed:   Two-view x-rays of the right hip performed in office today show no evidence of joint space degeneration or narrowing, no evidence of fractures or dislocation.      Two-view x-rays of the lumbar spine performed in office today show moderate joint space narrowing at L5-S1 with anterior osteophytes, no evidence of fracture or dislocation. There is moderate facet arthropathy noted at this level.    Assessment/Plan:     Assessment & Plan    LUMBAR RADICULITIS RIGHT L5 2/2 disk degeneration and lumbar spondylosis.  - Reviewed XRs of the patient's L spine.  - Perform stretching exercises for low back.  - Continue inverter as needed for lumbar traction.  - Started muscle relaxant as needed for muscle spasm.  - Continue ibuprofen as needed.  - Referred to PT for L spine strengthening and stability, 2-3 times per week for 6 " weeks.    FOLLOW-UP:  - Follow up in 6-8 weeks after completing PT, with MRI of the L spine if he has not had significant improvement in symptoms.      Rupert Collins MD    This note was generated with the assistance of ambient listening technology. Verbal consent was obtained by the patient and accompanying visitor(s) for the recording of patient appointment to facilitate this note. I attest to having reviewed and edited the generated note for accuracy, though some syntax or spelling errors may persist. Please contact the author of this note for any clarification.

## 2025-05-28 DIAGNOSIS — K64.3: Primary | ICD-10-CM

## 2025-05-29 ENCOUNTER — OFFICE VISIT (OUTPATIENT)
Dept: SURGICAL ONCOLOGY | Facility: CLINIC | Age: 65
End: 2025-05-29
Payer: COMMERCIAL

## 2025-05-29 VITALS
HEIGHT: 65 IN | DIASTOLIC BLOOD PRESSURE: 75 MMHG | BODY MASS INDEX: 38.82 KG/M2 | HEART RATE: 71 BPM | WEIGHT: 233 LBS | SYSTOLIC BLOOD PRESSURE: 149 MMHG | OXYGEN SATURATION: 96 %

## 2025-05-29 DIAGNOSIS — K64.3: ICD-10-CM

## 2025-05-29 PROCEDURE — 99999 PR PBB SHADOW E&M-EST. PATIENT-LVL IV: CPT | Mod: PBBFAC,,, | Performed by: COLON & RECTAL SURGERY

## 2025-05-29 NOTE — PROGRESS NOTES
Patient ID: [id number]     Chief Complaint: new patient (NP: THROMBOSED HEMORRHOID)      HPI:     Nate Rico is a 65 y.o. male here today for an initial consultation.  He was referred by ROWDY Brewer for a thrombosed hemorrhoid.  Three weeks ago he noticed a perianal bump that has progressively gotten bigger and painful.  He tried suppositories, antibiotics, and cream without resolution.  He denies bleeding and drainage.    Past Medical History:  has a past medical history of Elevated PSA, Essential (primary) hypertension, Gastroesophageal reflux disease, Hypertriglyceridemia, and Vitamin D deficiency.    Surgical History:  has a past surgical history that includes Prostate biopsy (05/2020); Colonoscopy (07/01/2019); FESS Septo Turb Cautery  (10/16/2015); Biopsy Gastrointestional  (06/03/2015); Cholecystectomy; Lithotripsy; sx to remove kidney stones; and Tonsillectomy.    Family History: family history includes Coronary artery disease in his mother; Diabetes in his father; Heart attack in his father and mother; Hypertension in his father; No Known Problems in his brother, sister, and sister; Ulcers in his father.    Social History:  reports that he has quit smoking. His smoking use included cigarettes. He has never used smokeless tobacco. He reports current alcohol use. He reports that he does not use drugs.    Current Outpatient Medications   Medication Instructions    amLODIPine (NORVASC) 5 mg, Oral, Daily    cetirizine (ZYRTEC) 10 mg    cholecalciferol, vitamin D3, (VITAMIN D3) 125 mcg (5,000 unit) Tab Weekly    fenofibrate (TRICOR) 145 mg    finasteride (PROSCAR) 5 mg, Daily    hydroCHLOROthiazide 12.5 mg    levothyroxine (EUTHYROX) 50 mcg, Oral, Daily    pantoprazole (PROTONIX) 40 mg, Oral, Daily    tamsulosin (FLOMAX) 0.4 mg Cap 1 capsule, Daily    tiZANidine (ZANAFLEX) 2 mg, Oral, Every 8 hours PRN    valsartan (DIOVAN) 320 mg, Oral, Daily       Patient has no known allergies.     Patient  "Care Team:  Glen Dunne MD as PCP - General (Family Medicine)  Matt Lantigua MD as Consulting Physician (Urology)  Vincent Reddy MD as Consulting Physician (Gastroenterology)  Ever Al MD as Consulting Physician (Cardiology)       Subjective:     Review of Systems    12 point review of systems conducted, negative except as stated in the history of present illness. See HPI for details.      Objective:     Visit Vitals  BP (!) 149/75 (BP Location: Left arm, Patient Position: Sitting)   Pulse 71   Ht 5' 5" (1.651 m)   Wt 105.7 kg (233 lb)   SpO2 96%   BMI 38.77 kg/m²       Physical Exam    General: Alert and oriented, No acute distress.  Head: Normocephalic, Atraumatic.  Eye: Pupils are equal and round, Extraocular movements are intact, Sclera non-icteric.  Ears/Nose/Throat: Normal, Mucosa moist, Clear.  Respiratory: No wheezes, Non-labored respirations, Symmetrical chest wall expansion.  Cardiovascular: Regular rate.  Gastrointestinal: Soft, Non-tender, Non-distended, Normal bowel sounds, No palpable masses.  Rectal: Large thrombosed right anterior external hemorrhoid.  Integumentary: Warm, Dry, Intact, No rashes.  Extremities: No edema.  Neurologic: No focal deficits.  Psychiatric: Normal interaction, Coherent speech, Euthymic mood, Appropriate affect.       Labs Reviewed:     Chemistry:  Lab Results   Component Value Date     06/27/2024    K 4.3 06/27/2024    BUN 30.2 (H) 06/27/2024    CREATININE 1.08 06/27/2024    EGFRNORACEVR >60 06/27/2024    CALCIUM 9.4 06/27/2024    ALKPHOS 50 06/27/2024    ALBUMIN 4.0 06/27/2024    BILIDIR 0.1 06/24/2021    IBILI 0.4 06/24/2021    AST 21 06/27/2024    ALT 27 06/27/2024        Hematology:  Lab Results   Component Value Date    WBC 8.32 06/27/2024    HGB 16.3 06/27/2024    HCT 49.9 06/27/2024     06/27/2024         Assessment:       ICD-10-CM ICD-9-CM   1. Thrombosed grade IV hemorrhoid  K64.3 455.7        Plan:     I&D in office today      No " follow-ups on file. In addition to their scheduled follow up, the patient has also been instructed to follow up on as needed basis.     Future Appointments   Date Time Provider Department Center   6/16/2025  1:30 PM Rupert Collins MD [facility]   7/8/2025  7:30 AM [facility]     [facility]

## 2025-05-29 NOTE — PROGRESS NOTES
Date of Procedure:  05/29/2025    Procedure: I&D of thrombosed external hemorrhoid    Pre-Operative Diagnosis: Thrombosed external hemorrhoid    Post-Operative Diagnosis: Same    Anesthesia: 1% Lidocaine with epinephrine    Estimated Blood Loss (EBL): Less than 10 mL           Specimens: None     Description of Technical Procedures:  After informed consent was obtained, patient was placed in the left lateral decubitus position.  The perianal skin was prepped with Betadine.  1% lidocaine with epinephrine was used for local anesthesia.  The anoderm was incised sharply with scissors and the clot was removed.  A figure-of-eight 3-0 chromic suture was placed at the medial aspect of the incision for hemostasis.  No active bleeding was noted.  A dry gauze dressing was applied and secured with tape.  Patient tolerated procedure well and there were no complications.

## 2025-06-30 DIAGNOSIS — Z00.00 WELLNESS EXAMINATION: Primary | ICD-10-CM

## 2025-07-08 ENCOUNTER — TELEPHONE (OUTPATIENT)
Dept: FAMILY MEDICINE | Facility: CLINIC | Age: 65
End: 2025-07-08
Payer: COMMERCIAL

## 2025-07-09 NOTE — PROGRESS NOTES
..Annual Exam (Labs not done remains fasting)       HPI:     Patient presents for wellness examination.    He has been feeling well.    He has been sleeping so so.  He uses his C-PAP regularly.   His appetite is too good.  He is a   He is  with 3 children.    He may have alcohol on weekends.  He drinks coffee most days; he infrequently has soft drinks.  He does not smoke. He quit dipping 2 months ago.   He does yd work.  He does not exercise.   Colonoscopy 12/03/2024: Dr. Reddy; polyps x 1, mild diverticulosis, internal hemorrhoids.  Follow up 5 years.  ENT: Dr. Olivera; hearing aids, ERIC on C-PAP.   Urology: Dr Matt Lantigua; BPH, elevated PSA. Sees him next week.  Cardio: Dr. Al; sees him in September  Patient saw Dr. Herrera on 05/29/2025 for thrombosed grade 4 hemorrhoids.      The patient's Health Maintenance was reviewed and the following appears to be due at this time:   Health Maintenance Due   Topic Date Due    Hepatitis C Screening  Never done    HIV Screening  Never done    High Dose Statin  Never done    Shingles Vaccine (1 of 2) Never done    Pneumococcal Vaccines (Age 50+) (2 of 2 - PPSV23) 04/22/2016    RSV Vaccine (Age 60+ and Pregnant patients) (1 - Risk 60-74 years 1-dose series) Never done    COVID-19 Vaccine (1 - 2024-25 season) Never done    Hemoglobin A1c (Prediabetes)  06/27/2025       ..  Past Medical History:   Diagnosis Date    Elevated PSA     Essential (primary) hypertension     Gastroesophageal reflux disease     Hypertriglyceridemia     Vitamin D deficiency           ..  Past Surgical History:   Procedure Laterality Date    BAND HEMORRHOIDECTOMY      Biopsy Gastrointestional   06/03/2015    CHOLECYSTECTOMY      COLONOSCOPY  07/01/2019    FESS Septo Turb Cautery   10/16/2015    LITHOTRIPSY      PROSTATE BIOPSY  05/2020    sx to remove kidney stones      TONSILLECTOMY            Current Outpatient Medications   Medication Instructions    amLODIPine (NORVASC) 5 mg,  Oral, Daily    cetirizine (ZYRTEC) 10 mg    cholecalciferol, vitamin D3, (VITAMIN D3) 125 mcg (5,000 unit) Tab Weekly    fenofibrate (TRICOR) 145 mg    finasteride (PROSCAR) 5 mg, Daily    hydroCHLOROthiazide 12.5 mg    levothyroxine (EUTHYROX) 50 mcg, Oral, Daily    pantoprazole (PROTONIX) 40 mg, Oral, Daily    tamsulosin (FLOMAX) 0.4 mg Cap 1 capsule, Daily    valsartan (DIOVAN) 320 mg, Oral, Daily         ..Social History[1]       ..  Family History   Problem Relation Name Age of Onset    Coronary artery disease Mother Amber     Heart attack Mother Amber     Heart disease Mother Amber     Diabetes Father Brett     Heart attack Father Brett     Hypertension Father Brett     Ulcers Father Brett     Cancer Father Brett     Heart disease Father Brett     No Known Problems Sister          Covid    No Known Problems Sister      No Known Problems Brother            ..Review of patient's allergies indicates:  No Known Allergies       ..  Immunization History   Administered Date(s) Administered    Pneumococcal Conjugate - 13 Valent 04/22/2015    Tdap 08/02/2011, 06/26/2020          REVIEW OF SYSTEMS:    GENERAL: No weight loss, no weight gain, no fever, + occasional  fatigue, no chills, + night sweats  HEENT: No sore throat, no ear pain, no sinus pressure, + nasal congestion, + history of nasal fracture, no rhinorrhea, + decreased hearing: aids,  no snoring  VISION: No vision changes, no blurry vision, no double vision, no glaucoma, no cataracts, + glasses  LAST EYE EXAM: 2024  NECK: no LAD  CARDIAC: No chest pain, no palpitations, no dyspnea on exertion, no orthopnea  RESPIRATORY: + occasional cough, no wheezing, no sputum production, no SOB  GI: No abdominal pain, no N/V, + bloating, no heartburn, no constipation, no diarrhea, no blood in stool, (- ) family history of Colon Ca  : No dysuria, no hematuria, no frequency, + decreased stream at times, no urgency, no incontinence, no testicular pain/swelling,  "(- ) family history of Prostate Ca  MUSC/SKEL: No myalgia, no weakness, no edema, no arthralgia, no joint swelling/effusions  SKIN: No rashes, no hives, no itching, no sores  NEURO: No HA, + numbness: right thumb,  no tingling, no weakness, no dizziness  PSYCH: No anxiety, no depression, no irritability, no panic attacks, no s/i, no h/i, no hallucinations  ENDO: No polyuria, no polyphagia, no polydipsia  HEME: No bruising, no bleeding disorders, no signs of anemia.       PHYSICAL EXAM:    ..Visit Vitals  /77   Pulse 71   Temp 98.1 °F (36.7 °C)   Ht 5' 5" (1.651 m)   Wt 106.9 kg (235 lb 9.6 oz)   SpO2 95%   BMI 39.21 kg/m²        General: Well developed, well nourished obese white male in no apparent distress, alert and oriented x3  Skin: No rash or abnormal lesions  HEENT: Normocephalic, PERRLA, EOMI, mouth WNL, throat WNL, nares normal, EAC and TM WNL bilaterally  Neck: FROM, no LAD, no thyroid abnormalities palpable  Chest: CTA bilaterally, no wheezes crackles or rubs  Cardiac: RRR, no murmurs, rubs, gallops  Abdomen: Soft, nontender, nondistended, NBSx4, no rebound tenderness or guarding, no HSM  Extremities: No clubbing, cyanosis, or edema. Joints WNL, +2 DP/PT pulses bilaterally  Neuro: No sensory or motor defects noted. CN II-XII intact. Gait WNL.  Genital: normal testes, no hernias  Rectal: Deferred      1. Encounter for wellness examination in adult  Overview:  Patient presents for wellness examination.    He has been feeling well.    He is compliant with his C-PAP.   Patient encouraged to make healthy food choices and limit portions.    Patient encouraged to limit intake of fried foods, processed foods and sugary foods.    Patient encouraged to increase physical activity, exercise and lose weight.    ENT: Dr. Olivera; bilateral hearing loss, now with hearing aids.  Obstructive sleep apnea on CPAP.  Urology: Dr. Matt Lantigua; elevated PSA, BPH.  Cardio: Dr Al; negative cardiac evaluation " 10/2022.  Patient declines Shingrix vaccine at this time.    Labs pending.    07/03/2024:  Patient presents for wellness examination.    He has been feeling well.    Patient is compliant with and doing well CPAP therapy.    Patient encouraged to make healthy food choices and limit portions.  Patient encouraged to limit intake of fried foods, processed foods and sugary foods.    Patient encouraged to increase physical activity, exercise regularly and lose weight.    Last colonoscopy 08/2019: Dr Reddy; polyps x3, mild diverticulosis, internal hemorrhoids, follow-up in 5 years.  ENT: Dr. Cristy Olivera; obstructive sleep apnea on CPAP, hearing aids.  Urology: Dr. Matt Lantigua; BPH, elevated PSA, sees him next week.  PSA pending.    Cardio: Dr. Al; saw him beginning of year.  Labs reviewed with patient.    Patient declines Shingrix and RSV vaccines.    Assessment & Plan:  Patient presents for wellness examination.    He has been feeling well.    Patient is compliant with and doing well CPAP therapy.    Patient encouraged to make healthy food choices and limit portions.  Patient encouraged to limit intake of fried foods, processed foods and sugary foods.    Patient encouraged to increase physical activity, exercise regularly and lose weight.    Last colonoscopy 12/03/2024:  Dr Reddy; polyps x 1, mild diverticulosis, internal hemorrhoids, follow-up in 5 years.  Patient had removal of thrombosed hemorrhoid on 05/29/2024 per Dr. Jak Herrera.   ENT: Dr. Cristy Olivera; obstructive sleep apnea on CPAP, hearing aids.  Urology: Dr. Matt Lantigua; BPH, elevated PSA, sees him next week.  PSA pending.    Cardio: Dr. Al; sees him in September.  Patient declines pneumonia, RSV and Shingrix vaccines.    Labs pending.      2. Essential (primary) hypertension  Overview:  Controlled; continue current medications.    Limit sodium consumption.    Patient encouraged to increase physical activity, exercise and lose  weight.    07/03/2024:  His blood pressure is well controlled; continue amlodipine, valsartan and hydrochlorothiazide; refills sent.    Limit sodium consumption.  Patient encouraged to lose weight.    Assessment & Plan:  His blood pressure has been well controlled; he only takes amlodipine in the evening if his blood pressure is high.      3. Hypertriglyceridemia  Overview:  Patient discontinue fenofibrate 1 month ago due to GI distress.    Lipid profile pending.    Limit intake of sugary foods, refined carbohydrates and fatty foods.    Patient encouraged to lose weight.    07/03/2024:  Lipid profile: Total cholesterol 183, HDL 30, triglycerides 183 and .  Fenofibrate causes patient GI distress; take 3 times a week.  Continue low-cholesterol/low-fat diet.    Patient encouraged to lose weight.    Assessment & Plan:  Lipid profile pending.    See overview.      4. Dyspnea on exertion  Overview:  Probably secondary to obesity/deconditioning.    Patient had a negative cardiac workup    07/03/2024: Patient still has dyspnea with exertion.  Patient denies any change from previous status.    Assessment & Plan:  Stable; see overview.      5. Other forms of angina pectoris  Overview:  07/03/2024: Stable; followed by Dr. Al.    Assessment & Plan:  Stable; followed by Dr. Al.       6. Benign prostatic hyperplasia, unspecified whether lower urinary tract symptoms present  Overview:  Stable; followed by Dr. Matt Lantigua.  PSA pending.    Assessment & Plan:  Stable; followed by Dr. Matt Lantigua.  Patient is on tamsulosin and finasteride.  He reports decreased stream at times; he otherwise denies obstructive or irritative voiding symptoms.      7. Elevated PSA  Overview:  Followed by Dr. Matt Lantigua; PSA pending.  Will forward results to Dr. Lantigua.    07/03/2024:  Patient with history of elevated PSA; followed by Dr. Matt Lantigua.  PSA pending.    Assessment & Plan:  Patient with history of elevated PSA; followed  by Dr. Matt Lantigua.  PSA pending.      8. Acquired hypothyroidism  Overview:  Patient has been compliant with medication; refills sent.  TSH pending.    07/03/2024:    TSH 1.42.    Continue current dose of levothyroxine; refills sent.    Assessment & Plan:  Continue current dosage of levothyroxine.  TSH pending.      9. Gastroesophageal reflux disease, unspecified whether esophagitis present  Overview:  His reflux is well controlled on medications; refills sent.    07/03/2024:  His reflux is well controlled on Protonix; refills sent.    Assessment & Plan:  His reflux is well controlled on Protonix.      10. Vitamin D deficiency  Overview:  Patient is currently on 25,000 units weekly.    Vitamin-D level pending.    07/03/2024: Patient states he takes 5000 units weekly.    Vitamin-D level 59; continue current supplementation.    Assessment & Plan:  Patient takes 00202 units weekly; vitamin-D level pending.      11. Obstructive sleep apnea syndrome  Overview:  Patient is compliant with and doing well on CPAP therapy.     07/03/2024:  Patient is compliant with and doing well on CPAP therapy.    Assessment & Plan:  Patient is compliant with and doing well on CPAP therapy.      12. Class 2 obesity due to excess calories without serious comorbidity with body mass index (BMI) of 39.0 to 39.9 in adult  Overview:  Patient encouraged to make healthy food choices limit portions.    Patient encouraged to limit intake of fried foods, processed foods and sugary foods.    Patient encouraged to increase physical activity, exercise and lose weight.    07/03/2024:  Patient encouraged to make healthy food choices and limit portions.    Patient encouraged to limit intake of fried foods, processed foods and sugary foods.    Patient encouraged to lose weight.     Assessment & Plan:  Patient has gained 10 lb since last year.    See overview.      13. Sensorineural hearing loss (SNHL) of both ears  Overview:  07/10/2025: Patient wears  hearing aids with good results.      14. Immunization refused  Overview:  Patient declines shingles vaccine; benefits/potential risks/side effects discussed with patient.    07/03/2024: Patient declines Shingrix and RSV vaccine; benefits and risks reviewed with patient.    Assessment & Plan:  Patient declines pneumonia, RSV and Shingrix vaccines; benefits, potential risks and side effects discussed with patient.      15. Pre-diabetes  Overview:  07/10/2025:  Patient with history of prediabetes.    A1c pending.    Patient advised to limit intake of sugary foods and refined carbohydrates.           ..No follow-ups on file.     Future Appointments   Date Time Provider Department Center   7/10/2025 12:40 PM LAB, [facility] D[facility] BS LAB [facility]   7/21/2026  7:30 AM [place] MD [facility] T[facility]              [1]   Social History  Socioeconomic History    Marital status:     Number of children: 3   Occupational History    Occupation:    Tobacco Use    Smoking status: Former     Current packs/day: 20.00     Types: Cigarettes     Passive exposure: Past    Smokeless tobacco: Never    Tobacco comments:     Dips   Substance and Sexual Activity    Alcohol use: Yes     Comment: Occasionally    Drug use: Never    Sexual activity: Yes     Partners: Female     Birth control/protection: None     Social Drivers of Health     Financial Resource Strain: Low Risk  (7/8/2025)    Overall Financial Resource Strain (CARDIA)     Difficulty of Paying Living Expenses: Not very hard   Food Insecurity: No Food Insecurity (7/8/2025)    Hunger Vital Sign     Worried About Running Out of Food in the Last Year: Never true     Ran Out of Food in the Last Year: Never true   Transportation Needs: No Transportation Needs (7/8/2025)    PRAPARE - Transportation     Lack of Transportation (Medical): No     Lack of Transportation (Non-Medical): No   Physical Activity: Insufficiently Active (7/8/2025)    Exercise Vital  Sign     Days of Exercise per Week: 1 day     Minutes of Exercise per Session: 30 min   Stress: No Stress Concern Present (7/8/2025)    [employer] [place] [employer] - Occupational Stress Questionnaire     Feeling of Stress : Only a little   Housing Stability: Unknown (7/8/2025)    Housing Stability Vital Sign     Homeless in the Last Year: No

## 2025-07-10 ENCOUNTER — OFFICE VISIT (OUTPATIENT)
Dept: PRIMARY CARE CLINIC | Facility: CLINIC | Age: 65
End: 2025-07-10
Payer: COMMERCIAL

## 2025-07-10 ENCOUNTER — LAB VISIT (OUTPATIENT)
Dept: LAB | Facility: HOSPITAL | Age: 65
End: 2025-07-10
Attending: FAMILY MEDICINE
Payer: COMMERCIAL

## 2025-07-10 VITALS
HEIGHT: 65 IN | SYSTOLIC BLOOD PRESSURE: 137 MMHG | BODY MASS INDEX: 39.26 KG/M2 | WEIGHT: 235.63 LBS | DIASTOLIC BLOOD PRESSURE: 77 MMHG | TEMPERATURE: 98 F | OXYGEN SATURATION: 95 % | HEART RATE: 71 BPM

## 2025-07-10 DIAGNOSIS — E03.9 ACQUIRED HYPOTHYROIDISM: ICD-10-CM

## 2025-07-10 DIAGNOSIS — E78.1 HYPERTRIGLYCERIDEMIA: ICD-10-CM

## 2025-07-10 DIAGNOSIS — I10 ESSENTIAL (PRIMARY) HYPERTENSION: ICD-10-CM

## 2025-07-10 DIAGNOSIS — E55.9 VITAMIN D DEFICIENCY: ICD-10-CM

## 2025-07-10 DIAGNOSIS — I20.89 OTHER FORMS OF ANGINA PECTORIS: ICD-10-CM

## 2025-07-10 DIAGNOSIS — Z28.21 IMMUNIZATION REFUSED: ICD-10-CM

## 2025-07-10 DIAGNOSIS — Z00.00 ENCOUNTER FOR WELLNESS EXAMINATION IN ADULT: Primary | ICD-10-CM

## 2025-07-10 DIAGNOSIS — H90.3 SENSORINEURAL HEARING LOSS (SNHL) OF BOTH EARS: ICD-10-CM

## 2025-07-10 DIAGNOSIS — R97.20 ELEVATED PSA: ICD-10-CM

## 2025-07-10 DIAGNOSIS — G47.33 OBSTRUCTIVE SLEEP APNEA SYNDROME: ICD-10-CM

## 2025-07-10 DIAGNOSIS — E66.812 CLASS 2 OBESITY DUE TO EXCESS CALORIES WITHOUT SERIOUS COMORBIDITY WITH BODY MASS INDEX (BMI) OF 39.0 TO 39.9 IN ADULT: ICD-10-CM

## 2025-07-10 DIAGNOSIS — K21.9 GASTROESOPHAGEAL REFLUX DISEASE, UNSPECIFIED WHETHER ESOPHAGITIS PRESENT: ICD-10-CM

## 2025-07-10 DIAGNOSIS — R06.09 DYSPNEA ON EXERTION: ICD-10-CM

## 2025-07-10 DIAGNOSIS — R73.03 PRE-DIABETES: ICD-10-CM

## 2025-07-10 DIAGNOSIS — E66.09 CLASS 2 OBESITY DUE TO EXCESS CALORIES WITHOUT SERIOUS COMORBIDITY WITH BODY MASS INDEX (BMI) OF 39.0 TO 39.9 IN ADULT: ICD-10-CM

## 2025-07-10 DIAGNOSIS — N40.0 BENIGN PROSTATIC HYPERPLASIA, UNSPECIFIED WHETHER LOWER URINARY TRACT SYMPTOMS PRESENT: ICD-10-CM

## 2025-07-10 DIAGNOSIS — Z00.00 WELLNESS EXAMINATION: ICD-10-CM

## 2025-07-10 LAB
25(OH)D3+25(OH)D2 SERPL-MCNC: 38 NG/ML (ref 30–80)
ALBUMIN SERPL-MCNC: 3.9 G/DL (ref 3.4–4.8)
ALBUMIN/GLOB SERPL: 1.4 RATIO (ref 1.1–2)
ALP SERPL-CCNC: 59 UNIT/L (ref 40–150)
ALT SERPL-CCNC: 27 UNIT/L (ref 0–55)
ANION GAP SERPL CALC-SCNC: 8 MEQ/L
AST SERPL-CCNC: 18 UNIT/L (ref 11–45)
BACTERIA #/AREA URNS AUTO: ABNORMAL /HPF
BASOPHILS # BLD AUTO: 0.03 X10(3)/MCL
BASOPHILS NFR BLD AUTO: 0.4 %
BILIRUB SERPL-MCNC: 0.6 MG/DL
BILIRUB UR QL STRIP.AUTO: NEGATIVE
BUN SERPL-MCNC: 23.5 MG/DL (ref 8.4–25.7)
CALCIUM SERPL-MCNC: 9.7 MG/DL (ref 8.8–10)
CHLORIDE SERPL-SCNC: 106 MMOL/L (ref 98–107)
CHOLEST SERPL-MCNC: 154 MG/DL
CHOLEST/HDLC SERPL: 5 {RATIO} (ref 0–5)
CLARITY UR: CLEAR
CO2 SERPL-SCNC: 26 MMOL/L (ref 23–31)
COLOR UR AUTO: ABNORMAL
CREAT SERPL-MCNC: 0.97 MG/DL (ref 0.72–1.25)
CREAT/UREA NIT SERPL: 24
EOSINOPHIL # BLD AUTO: 0.27 X10(3)/MCL (ref 0–0.9)
EOSINOPHIL NFR BLD AUTO: 3.2 %
ERYTHROCYTE [DISTWIDTH] IN BLOOD BY AUTOMATED COUNT: 13.6 % (ref 11.5–17)
EST. AVERAGE GLUCOSE BLD GHB EST-MCNC: 139.9 MG/DL
GFR SERPLBLD CREATININE-BSD FMLA CKD-EPI: >60 ML/MIN/1.73/M2
GLOBULIN SER-MCNC: 2.8 GM/DL (ref 2.4–3.5)
GLUCOSE SERPL-MCNC: 113 MG/DL (ref 82–115)
GLUCOSE UR QL STRIP: NORMAL
HBA1C MFR BLD: 6.5 %
HCT VFR BLD AUTO: 51.2 % (ref 42–52)
HDLC SERPL-MCNC: 30 MG/DL (ref 35–60)
HGB BLD-MCNC: 16.7 G/DL (ref 14–18)
HGB UR QL STRIP: NEGATIVE
IMM GRANULOCYTES # BLD AUTO: 0.05 X10(3)/MCL (ref 0–0.04)
IMM GRANULOCYTES NFR BLD AUTO: 0.6 %
KETONES UR QL STRIP: NEGATIVE
LDLC SERPL CALC-MCNC: 88 MG/DL (ref 50–140)
LEUKOCYTE ESTERASE UR QL STRIP: 500
LYMPHOCYTES # BLD AUTO: 1.18 X10(3)/MCL (ref 0.6–4.6)
LYMPHOCYTES NFR BLD AUTO: 14 %
MCH RBC QN AUTO: 28.9 PG (ref 27–31)
MCHC RBC AUTO-ENTMCNC: 32.6 G/DL (ref 33–36)
MCV RBC AUTO: 88.6 FL (ref 80–94)
MONOCYTES # BLD AUTO: 0.65 X10(3)/MCL (ref 0.1–1.3)
MONOCYTES NFR BLD AUTO: 7.7 %
MUCOUS THREADS URNS QL MICRO: ABNORMAL /LPF
NEUTROPHILS # BLD AUTO: 6.23 X10(3)/MCL (ref 2.1–9.2)
NEUTROPHILS NFR BLD AUTO: 74.1 %
NITRITE UR QL STRIP: NEGATIVE
NRBC BLD AUTO-RTO: 0 %
PH UR STRIP: 6 [PH]
PLATELET # BLD AUTO: 203 X10(3)/MCL (ref 130–400)
PMV BLD AUTO: 10.6 FL (ref 7.4–10.4)
POTASSIUM SERPL-SCNC: 4.8 MMOL/L (ref 3.5–5.1)
PROT SERPL-MCNC: 6.7 GM/DL (ref 5.8–7.6)
PROT UR QL STRIP: NEGATIVE
PSA SERPL-MCNC: 1.39 NG/ML
RBC # BLD AUTO: 5.78 X10(6)/MCL (ref 4.7–6.1)
RBC #/AREA URNS AUTO: ABNORMAL /HPF
SODIUM SERPL-SCNC: 140 MMOL/L (ref 136–145)
SP GR UR STRIP.AUTO: 1.01 (ref 1–1.03)
SQUAMOUS #/AREA URNS LPF: ABNORMAL /HPF
T4 FREE SERPL-MCNC: 1.05 NG/DL (ref 0.7–1.48)
TRIGL SERPL-MCNC: 182 MG/DL (ref 34–140)
TSH SERPL-ACNC: 1.54 UIU/ML (ref 0.35–4.94)
UROBILINOGEN UR STRIP-ACNC: NORMAL
VLDLC SERPL CALC-MCNC: 36 MG/DL
WBC # BLD AUTO: 8.41 X10(3)/MCL (ref 4.5–11.5)
WBC #/AREA URNS AUTO: ABNORMAL /HPF

## 2025-07-10 PROCEDURE — 87086 URINE CULTURE/COLONY COUNT: CPT

## 2025-07-10 PROCEDURE — 84153 ASSAY OF PSA TOTAL: CPT

## 2025-07-10 PROCEDURE — 82306 VITAMIN D 25 HYDROXY: CPT

## 2025-07-10 PROCEDURE — 83036 HEMOGLOBIN GLYCOSYLATED A1C: CPT

## 2025-07-10 PROCEDURE — 80053 COMPREHEN METABOLIC PANEL: CPT

## 2025-07-10 PROCEDURE — 80061 LIPID PANEL: CPT

## 2025-07-10 PROCEDURE — 36415 COLL VENOUS BLD VENIPUNCTURE: CPT

## 2025-07-10 PROCEDURE — 85025 COMPLETE CBC W/AUTO DIFF WBC: CPT

## 2025-07-10 PROCEDURE — 81015 MICROSCOPIC EXAM OF URINE: CPT

## 2025-07-10 PROCEDURE — 84443 ASSAY THYROID STIM HORMONE: CPT

## 2025-07-10 PROCEDURE — 84439 ASSAY OF FREE THYROXINE: CPT

## 2025-07-10 NOTE — ASSESSMENT & PLAN NOTE
Patient presents for wellness examination.    He has been feeling well.    Patient is compliant with and doing well CPAP therapy.    Patient encouraged to make healthy food choices and limit portions.  Patient encouraged to limit intake of fried foods, processed foods and sugary foods.    Patient encouraged to increase physical activity, exercise regularly and lose weight.    Last colonoscopy 12/03/2024:  Dr Reddy; polyps x 1, mild diverticulosis, internal hemorrhoids, follow-up in 5 years.  Patient had removal of thrombosed hemorrhoid on 05/29/2024 per Dr. Jak Herrera.   ENT: Dr. Cristy Olivera; obstructive sleep apnea on CPAP, hearing aids.  Urology: Dr. Matt Lantigua; BPH, elevated PSA, sees him next week.  PSA pending.    Cardio: Dr. Al; sees him in September.  Patient declines pneumonia, RSV and Shingrix vaccines.    Labs pending.

## 2025-07-10 NOTE — ASSESSMENT & PLAN NOTE
Patient declines pneumonia, RSV and Shingrix vaccines; benefits, potential risks and side effects discussed with patient.

## 2025-07-10 NOTE — ASSESSMENT & PLAN NOTE
His blood pressure has been well controlled; he only takes amlodipine in the evening if his blood pressure is high.

## 2025-07-10 NOTE — ASSESSMENT & PLAN NOTE
Stable; followed by Dr. Matt Lantigua.  Patient is on tamsulosin and finasteride.  He reports decreased stream at times; he otherwise denies obstructive or irritative voiding symptoms.

## 2025-07-12 LAB — BACTERIA UR CULT: NO GROWTH

## 2025-08-20 ENCOUNTER — TELEPHONE (OUTPATIENT)
Dept: PRIMARY CARE CLINIC | Facility: CLINIC | Age: 65
End: 2025-08-20
Payer: COMMERCIAL

## 2025-08-22 ENCOUNTER — PATIENT MESSAGE (OUTPATIENT)
Dept: PRIMARY CARE CLINIC | Facility: CLINIC | Age: 65
End: 2025-08-22
Payer: COMMERCIAL